# Patient Record
Sex: FEMALE | Race: WHITE | Employment: OTHER | URBAN - NONMETROPOLITAN AREA
[De-identification: names, ages, dates, MRNs, and addresses within clinical notes are randomized per-mention and may not be internally consistent; named-entity substitution may affect disease eponyms.]

---

## 2017-01-25 ENCOUNTER — AMBULATORY - GICH (OUTPATIENT)
Dept: GERIATRICS | Facility: OTHER | Age: 82
End: 2017-01-25

## 2017-01-25 DIAGNOSIS — Z91.81 HISTORY OF FALLING: ICD-10-CM

## 2017-01-25 DIAGNOSIS — Z51.5 ENCOUNTER FOR PALLIATIVE CARE: ICD-10-CM

## 2017-01-25 DIAGNOSIS — G30.1 ALZHEIMER'S DISEASE WITH LATE ONSET (CODE) (H): ICD-10-CM

## 2017-01-25 DIAGNOSIS — F02.818 DEMENTIA IN OTHER DISEASES CLASSIFIED ELSEWHERE WITH BEHAVIORAL DISTURBANCE: ICD-10-CM

## 2017-01-25 DIAGNOSIS — R60.0 LOCALIZED EDEMA: ICD-10-CM

## 2017-01-25 DIAGNOSIS — E03.8 OTHER SPECIFIED HYPOTHYROIDISM: ICD-10-CM

## 2017-03-30 ENCOUNTER — AMBULATORY - GICH (OUTPATIENT)
Dept: GERIATRICS | Facility: OTHER | Age: 82
End: 2017-03-30

## 2017-03-30 DIAGNOSIS — R69 ILLNESS: ICD-10-CM

## 2017-04-04 ENCOUNTER — HOSPITAL - GICH (OUTPATIENT)
Dept: LAB | Facility: OTHER | Age: 82
End: 2017-04-04

## 2017-04-04 DIAGNOSIS — E03.9 HYPOTHYROIDISM: ICD-10-CM

## 2017-04-04 DIAGNOSIS — E87.0 HYPEROSMOLALITY AND HYPERNATREMIA: ICD-10-CM

## 2017-04-04 LAB
ANION GAP - HISTORICAL: 8 (ref 5–18)
BUN SERPL-MCNC: 15 MG/DL (ref 7–25)
BUN/CREAT RATIO - HISTORICAL: 17
CALCIUM SERPL-MCNC: 9.2 MG/DL (ref 8.6–10.3)
CHLORIDE SERPLBLD-SCNC: 108 MMOL/L (ref 98–107)
CO2 SERPL-SCNC: 24 MMOL/L (ref 21–31)
CREAT SERPL-MCNC: 0.87 MG/DL (ref 0.7–1.3)
GFR IF NOT AFRICAN AMERICAN - HISTORICAL: >60 ML/MIN/1.73M2
GLUCOSE SERPL-MCNC: 84 MG/DL (ref 70–105)
POTASSIUM SERPL-SCNC: 4.2 MMOL/L (ref 3.5–5.1)
SODIUM SERPL-SCNC: 140 MMOL/L (ref 133–143)
TSH - HISTORICAL: 3.97 UIU/ML (ref 0.34–5.6)

## 2017-05-25 ENCOUNTER — AMBULATORY - GICH (OUTPATIENT)
Dept: GERIATRICS | Facility: OTHER | Age: 82
End: 2017-05-25

## 2017-05-25 ENCOUNTER — AMBULATORY - GICH (OUTPATIENT)
Dept: FAMILY MEDICINE | Facility: OTHER | Age: 82
End: 2017-05-25

## 2017-05-25 DIAGNOSIS — S70.02XA CONTUSION OF LEFT HIP: ICD-10-CM

## 2017-05-25 DIAGNOSIS — S70.12XA CONTUSION OF LEFT THIGH: ICD-10-CM

## 2017-05-25 DIAGNOSIS — F02.818 DEMENTIA IN OTHER DISEASES CLASSIFIED ELSEWHERE WITH BEHAVIORAL DISTURBANCE: ICD-10-CM

## 2017-05-25 DIAGNOSIS — B00.9 HERPESVIRAL INFECTION: ICD-10-CM

## 2017-05-25 DIAGNOSIS — Z74.09 OTHER REDUCED MOBILITY: ICD-10-CM

## 2017-05-25 DIAGNOSIS — E03.8 OTHER SPECIFIED HYPOTHYROIDISM: ICD-10-CM

## 2017-05-25 DIAGNOSIS — G30.1 ALZHEIMER'S DISEASE WITH LATE ONSET (CODE) (H): ICD-10-CM

## 2017-05-25 DIAGNOSIS — Z51.5 ENCOUNTER FOR PALLIATIVE CARE: ICD-10-CM

## 2017-06-09 ENCOUNTER — AMBULATORY - GICH (OUTPATIENT)
Dept: GERIATRICS | Facility: OTHER | Age: 82
End: 2017-06-09

## 2017-06-09 DIAGNOSIS — Z51.5 ENCOUNTER FOR PALLIATIVE CARE: ICD-10-CM

## 2017-06-09 DIAGNOSIS — F02.80 DEMENTIA IN OTHER DISEASES CLASSIFIED ELSEWHERE WITHOUT BEHAVIORAL DISTURBANCE: ICD-10-CM

## 2017-06-09 DIAGNOSIS — E03.8 OTHER SPECIFIED HYPOTHYROIDISM: ICD-10-CM

## 2017-06-09 DIAGNOSIS — G30.0 ALZHEIMER'S DISEASE WITH EARLY ONSET (CODE) (H): ICD-10-CM

## 2017-06-09 DIAGNOSIS — I10 ESSENTIAL (PRIMARY) HYPERTENSION: ICD-10-CM

## 2017-06-27 ENCOUNTER — COMMUNICATION - GICH (OUTPATIENT)
Dept: GERIATRICS | Facility: OTHER | Age: 82
End: 2017-06-27

## 2017-06-27 DIAGNOSIS — R60.0 LOCALIZED EDEMA: ICD-10-CM

## 2017-06-27 DIAGNOSIS — E03.8 OTHER SPECIFIED HYPOTHYROIDISM: ICD-10-CM

## 2017-07-19 ENCOUNTER — COMMUNICATION - GICH (OUTPATIENT)
Dept: FAMILY MEDICINE | Facility: OTHER | Age: 82
End: 2017-07-19

## 2017-08-08 ENCOUNTER — AMBULATORY - GICH (OUTPATIENT)
Dept: FAMILY MEDICINE | Facility: OTHER | Age: 82
End: 2017-08-08

## 2017-08-08 ENCOUNTER — AMBULATORY - GICH (OUTPATIENT)
Dept: GERIATRICS | Facility: OTHER | Age: 82
End: 2017-08-08

## 2017-08-08 DIAGNOSIS — R32 URINARY INCONTINENCE: ICD-10-CM

## 2017-08-08 DIAGNOSIS — F02.818 DEMENTIA IN OTHER DISEASES CLASSIFIED ELSEWHERE WITH BEHAVIORAL DISTURBANCE: ICD-10-CM

## 2017-08-08 DIAGNOSIS — G30.1 ALZHEIMER'S DISEASE WITH LATE ONSET (CODE) (H): ICD-10-CM

## 2017-08-08 DIAGNOSIS — B00.9 HERPESVIRAL INFECTION: ICD-10-CM

## 2017-08-08 DIAGNOSIS — L01.1 IMPETIGINIZATION OF OTHER DERMATOSES: ICD-10-CM

## 2017-08-08 DIAGNOSIS — L08.89 OTHER SPECIFIED LOCAL INFECTIONS OF THE SKIN AND SUBCUTANEOUS TISSUE: ICD-10-CM

## 2017-08-08 DIAGNOSIS — Z51.5 ENCOUNTER FOR PALLIATIVE CARE: ICD-10-CM

## 2017-08-08 DIAGNOSIS — R39.81 FUNCTIONAL URINARY INCONTINENCE: ICD-10-CM

## 2017-09-01 ENCOUNTER — AMBULATORY - GICH (OUTPATIENT)
Dept: GERIATRICS | Facility: OTHER | Age: 82
End: 2017-09-01

## 2017-09-01 ENCOUNTER — AMBULATORY - GICH (OUTPATIENT)
Dept: FAMILY MEDICINE | Facility: OTHER | Age: 82
End: 2017-09-01

## 2017-09-01 DIAGNOSIS — F02.818 DEMENTIA IN OTHER DISEASES CLASSIFIED ELSEWHERE WITH BEHAVIORAL DISTURBANCE: ICD-10-CM

## 2017-09-01 DIAGNOSIS — Z74.09 OTHER REDUCED MOBILITY: ICD-10-CM

## 2017-09-01 DIAGNOSIS — Z51.5 ENCOUNTER FOR PALLIATIVE CARE: ICD-10-CM

## 2017-09-01 DIAGNOSIS — G30.1 ALZHEIMER'S DISEASE WITH LATE ONSET (CODE) (H): ICD-10-CM

## 2017-09-01 DIAGNOSIS — Z79.899 OTHER LONG TERM (CURRENT) DRUG THERAPY: ICD-10-CM

## 2017-09-01 DIAGNOSIS — B00.9 HERPESVIRAL INFECTION: ICD-10-CM

## 2017-09-07 ENCOUNTER — COMMUNICATION - GICH (OUTPATIENT)
Dept: FAMILY MEDICINE | Facility: OTHER | Age: 82
End: 2017-09-07

## 2017-09-07 ENCOUNTER — AMBULATORY - GICH (OUTPATIENT)
Dept: FAMILY MEDICINE | Facility: OTHER | Age: 82
End: 2017-09-07

## 2017-09-07 ENCOUNTER — AMBULATORY - GICH (OUTPATIENT)
Dept: GERIATRICS | Facility: OTHER | Age: 82
End: 2017-09-07

## 2017-09-07 DIAGNOSIS — B00.9 HERPESVIRAL INFECTION: ICD-10-CM

## 2017-09-07 DIAGNOSIS — Z51.5 ENCOUNTER FOR PALLIATIVE CARE: ICD-10-CM

## 2017-09-07 DIAGNOSIS — F02.818 DEMENTIA IN OTHER DISEASES CLASSIFIED ELSEWHERE WITH BEHAVIORAL DISTURBANCE: ICD-10-CM

## 2017-09-07 DIAGNOSIS — R63.4 ABNORMAL WEIGHT LOSS: ICD-10-CM

## 2017-09-07 DIAGNOSIS — R32 URINARY INCONTINENCE: ICD-10-CM

## 2017-09-07 DIAGNOSIS — G30.1 ALZHEIMER'S DISEASE WITH LATE ONSET (CODE) (H): ICD-10-CM

## 2017-09-07 DIAGNOSIS — F03.90 DEMENTIA WITHOUT BEHAVIORAL DISTURBANCE (H): ICD-10-CM

## 2017-09-07 DIAGNOSIS — Z79.899 OTHER LONG TERM (CURRENT) DRUG THERAPY: ICD-10-CM

## 2017-09-29 ENCOUNTER — AMBULATORY - GICH (OUTPATIENT)
Dept: GERIATRICS | Facility: OTHER | Age: 82
End: 2017-09-29

## 2017-09-29 DIAGNOSIS — E03.8 OTHER SPECIFIED HYPOTHYROIDISM: ICD-10-CM

## 2017-09-29 DIAGNOSIS — F02.818 DEMENTIA IN OTHER DISEASES CLASSIFIED ELSEWHERE WITH BEHAVIORAL DISTURBANCE: ICD-10-CM

## 2017-09-29 DIAGNOSIS — G30.1 ALZHEIMER'S DISEASE WITH LATE ONSET (CODE) (H): ICD-10-CM

## 2017-12-19 ENCOUNTER — AMBULATORY - GICH (OUTPATIENT)
Dept: SCHEDULING | Facility: OTHER | Age: 82
End: 2017-12-19

## 2017-12-24 ENCOUNTER — HISTORY (OUTPATIENT)
Dept: GERIATRICS | Facility: OTHER | Age: 82
End: 2017-12-24

## 2017-12-27 NOTE — PROGRESS NOTES
Patient Information     Patient Name MRN Tika Bhatt [id number] Female 1933      Progress Notes by Nadia Ya NP at 2017  3:20 AM     Author:  Nadia Ya NP Service:  (none) Author Type:  PHYS- Nurse Practitioner     Filed:  2017  6:26 PM Encounter Date:  2017 Status:  Signed     :  Nadia Ya NP (PHYS- Nurse Practitioner)            .

## 2017-12-27 NOTE — PROGRESS NOTES
Patient Information     Patient Name MRN Tika Bhatt [id number] Female 1933      Progress Notes by Nadia Ya NP at 2017  3:00 AM     Author:  Nadia Ya NP Service:  (none) Author Type:  PHYS- Nurse Practitioner     Filed:  2017 10:27 PM Encounter Date:  2017 Status:  Signed     :  Nadia Ya NP (PHYS- Nurse Practitioner)            .

## 2017-12-28 NOTE — TELEPHONE ENCOUNTER
Patient Information     Patient Name MRN Tika Bhatt [id number] Female 1933      Telephone Encounter by Helene Blakely RN at 2017  9:01 AM     Author:  Helene Blakely RN Service:  (none) Author Type:  NURS- Registered Nurse     Filed:  2017  9:04 AM Encounter Date:  2017 Status:  Signed     :  Helene Blakely RN (NURS- Registered Nurse)            Hypothyroidism    Office visit in the past 12 months or per provider note.    Last visit with NASEEM COFFMAN was on: 17  Next visit with NASEEM COFFMAN is on: No future appointment listed with this provider  Next visit with Nursing Home is on: No future appointment listed in this department    Lab testing requirements:  TSH annually  TSH (uIU/mL)    Date Value   2017 3.97       Max refill for 12 months from last office visit or per provider note.    Prescription refilled per RN Medication Refill Policy.................... Helene Blakely RN ....................  2017   9:03 AM

## 2017-12-28 NOTE — PROGRESS NOTES
Patient Information     Patient Name MRN Tika Bhatt [id number] Female 1933      Progress Notes by Nadia Ya NP at 2017  3:00 AM     Author:  Nadia Ya NP Service:  (none) Author Type:  PHYS- Nurse Practitioner     Filed:  2017  8:48 PM Encounter Date:  2017 Status:  Signed     :  Nadia Ya NP (PHYS- Nurse Practitioner)            .

## 2017-12-28 NOTE — TELEPHONE ENCOUNTER
Patient Information     Patient Name MRN Tika Bhatt [id number] Female 1933      Telephone Encounter by Carlito Maria MD at 2017 10:51 AM     Author:  Carlito Maria MD Service:  (none) Author Type:  Physician     Filed:  2017 10:53 AM Encounter Date:  2017 Status:  Signed     :  Carlito Maria MD (Physician)            I think there will likely be some temporary increased confusion but that it will likely resolve over the course of a few days and would be ok.    I don't have a problem with her starting PT but think it is probably not going to significantly change her ambulatory status as I would be concerned about her falling even if she had a bit more strength.    I would want her to be supervised with exercise.  Does he want me to put in a PT order to see if she would tolerate it?

## 2017-12-28 NOTE — TELEPHONE ENCOUNTER
"Patient Information     Patient Name MRN Tika Bhatt [id number] Female 1933      Telephone Encounter by Hortencia Foreman at 2017 12:20 PM     Author:  Hortencia Foreman  Service:  (none) Author Type:  (none)     Filed:  2017  1:53 PM  Encounter Date:  2017 Status:  Addendum     :  Hortencia Foreman        Related Notes: Original Note by Hortencia Foreman filed at 2017 12:30 PM            She is [facility] (special cares [facility]) and recently [facility] a [facility] one of their units into a memory care unit and they are using this unit. [facility] will be turning into a more secure (to protect wandering patients) but since Tika doesn't attempt to wander, they would like to open her space to someone who is more at risk for \"exit seeking\".   The move would not be very far, and the rooms are pretty similar, so her scenery would not change much. She would be moving over to the [facility] unit.    Ethel [phone] (Nurse manager) OK to leave voicemail.  Hortencia Foreman CMA([facility])  ..................2017   12:12 PM           "

## 2017-12-28 NOTE — PROGRESS NOTES
Patient Information     Patient Name MRN Sex Tika Strickland [id number] Female 1933      Progress Notes by Carlito Maria MD at 2017  5:20 AM     Author:  Carlito Maria MD  Service:  (none) Author Type:  Physician     Filed:  10/5/2017 11:07 AM  Encounter Date:  2017 Status:  Addendum     :  Carlito Maria MD (Physician)        Related Notes: Original Note by Carlito Maria MD (Physician) filed at 10/5/2017  9:52 AM            60 Day Re-certification    SUBJECTIVE:  Tika Au is a 84 y.o. Female.  Patient seems to suggest that she feels well. She is jovial and laughing. Her speech is fluent but she does not make any cognitive sense. Does not appear to be any pain.    Nursing Notes were reviewed.  Patient has been consuming greater than 75% of her meals with nutritional supplements as well. She has been taking it over a liter of fluid on daily average. She is in assisted of 1-2 staff with ADLs and 2 for mobility. Current dmitriy score 16. She is always been incontinent of bowel and bladder. She has not had any falls in the past 60 days. She does get light therapy daily and also has mirtazapine once daily for depressive disorder. They have not noticed any mood changes. She has not needed any Tylenol in the past month for complaints of pain. She does take Valtrex daily for HSV and has not had an outbreak.    Family was not present during this visit.    Current Outpatient Prescriptions on File Prior to Visit       Medication  Sig Dispense Refill     acetaminophen (TYLENOL) 325 mg tablet Take 2 tablets by mouth every 4 hours if needed. Max acetaminophen dose: 4000mg in 24 hrs. 100 tablet 0     levothyroxine (SYNTHROID) 50 mcg tablet TAKE 1 TAB BY MOUTH ONCE DAILY 90 tablet 2     medication order composer Milk of Magnesia Suspension 7.75% - Give 1 Tbsp by mouth as needed for bowel management.  0     mirtazapine (REMERON) 7.5 mg tablet Take 1 tablet by  mouth at bedtime.       MISCELLANEOUS MEDICAL SUPPLY (GRADUATED COMPRESSION STOCKINGS) Mild compression stockings feet to knees on during day off at night 1 Packet 1     MULTIVITAMIN ORAL Take 1 tablet by mouth.       nutritional supplements liqd House supplement TID between meals for loss of appetite. 1 Bottle 0     valACYclovir (VALTREX) 500 mg tablet Take 1 tablet by mouth once daily. 90 tablet 3     No current facility-administered medications on file prior to visit.        Patient Active Problem List     Diagnosis  Code     HYPOTHYROIDISM E03.9     ALZHEIMER'S DISEASE G30.9     TOBACCO ABUSE, HX OF Z87.891     Hypertension I10     Hyperlipidemia E78.5     Ankle edema M25.473     Recurrent herpes simplex B00.9     Late onset Alzheimer's disease with behavioral disturbance G30.1, F02.81     Bilateral lower extremity edema R60.0     Comfort measures only status Z51.5     CAP (community acquired pneumonia) J18.9     Weight loss R63.4     Nursing home resident Z59.3       Social History     Social History        Marital status:       Spouse name: N/A     Number of children:  N/A     Years of education:  N/A     Occupational History      Not on file.     Social History Main Topics        Smoking status:  Former Smoker     Types: Cigarettes     Quit date: 10/4/2016     Smokeless tobacco:  Never Used     Alcohol use  No     Drug use:  No     Sexual activity:  Not on file     Other Topics  Concern     Not on file      Social History Narrative     .   in care facility in [place].  3 sons.                       A comprehensive review of systems was unobtainable due to dementia    OBJECTIVE:  BP- 132/82 T-97.5 P-75 R-18 O2- 96% RA weight 150lbs.  EXAM:  General Appearance: Pleasant, alert, appropriate appearance for age. No acute distress  Thyroid Exam: No nodules or enlargement.  Chest/Respiratory Exam: Normal chest wall and respirations. Clear to auscultation.  Cardiovascular Exam: Regular rate  and rhythm. S1, S2, no murmur, click, gallop, or rubs.  Musculoskeletal Exam: No synovitis.  Muscle strength age and body habitus appropriate as well as equal and even.     ASSESSMENT/Plan :    CODE STATUS/PLAN OF CARE: DNR, POLST states to call son prior to emergency department and to try to utilize limited interventions. I discussed this with him at our last visit and he feels that depending on the likelihood for improvement feels that she would either want to be kept comfortable or perhaps evaluated in the emergency department and given antibiotics etc. for a potentially reversible acute condition.    Tika was seen today for nursing home visit.    Diagnoses and all orders for this visit:    Other specified hypothyroidism    Late onset Alzheimer's disease with behavioral disturbance  patient appears to be stable. She does have end-stage dementia and currently is unable to effectively communicate. She is fluent but does not make cognitive sense. She appears to be in a good mood and I would not suggest any further medication changes at this time. Labs would also be of minimal help.          Carlito Maria MD    This document was created using computer generated templates and voice activated software. Every effort has been made to assure accuracy, but errors may be present

## 2017-12-28 NOTE — PROGRESS NOTES
Patient Information     Patient Name MRN Tika Bhatt [id number] Female 1933      Progress Notes by Nadia Ya NP at 2017  3:20 AM     Author:  Nadia Ya NP Service:  (none) Author Type:  PHYS- Nurse Practitioner     Filed:  8/10/2017  5:39 PM Encounter Date:  2017 Status:  Signed     :  Nadia Ya NP (PHYS- Nurse Practitioner)            .

## 2017-12-28 NOTE — TELEPHONE ENCOUNTER
Patient Information     Patient Name MRN Tika Bhatt [id number] Female 1933      Telephone Encounter by Carlito Maria MD at 2017  9:33 PM     Author:  Carlito Maria MD Service:  (none) Author Type:  Physician     Filed:  2017  9:34 PM Encounter Date:  2017 Status:  Signed     :  Carlito Maria MD (Physician)            Adjusted and signed.  Thank you.

## 2017-12-28 NOTE — TELEPHONE ENCOUNTER
Patient Information     Patient Name MRN Tika Bhatt [id number] Female 1933      Telephone Encounter by Vielka Martinez at 2017 11:50 AM     Author:  Vielka Martinez Service:  (none) Author Type:  (none)     Filed:  2017 11:57 AM Encounter Date:  2017 Status:  Signed     :  Vielka Martinez            The patient's son, Sd, called the [facility].  He states he needs a letter written by Dr. Maria stating his mother has Alzheimer's and that she needs help with her financial affairs.  Sd stated his mother has been seen recently.  Sd stated he can be reached at [phone] if there are any questions regarding this.  He states it is okay to leave message.   Vielka Martinez ....................  2017   11:55 AM

## 2017-12-28 NOTE — PROGRESS NOTES
Patient Information     Patient Name MRN Tika Bhatt [id number] Female 1933      Progress Notes by Carlito Maria MD at 2017  4:40 AM     Author:  Carlito Maria MD Service:  (none) Author Type:  Physician     Filed:  2017 11:13 AM Encounter Date:  2017 Status:  Signed     :  Carlito Maria MD (Physician)            60 Day Re-certification    SUBJECTIVE:  Tika Au is a 84 y.o. Female.  Patient is smiling and comfortable. Some of her speech is intelligible.  Family feels that she has been comfortable. They have not noticed any concerns her son Sd,  Maco and another son were all present today. They feel like she is doing well. They feel like she is comfortable. We talked about care plan. They would want her to only oh have oral antibiotics and not be hospitalized.    Nursing Notes were reviewed. She has been doing fine. There was no concerns commented on.    Family was  present during this visit.    Current Outpatient Prescriptions on File Prior to Visit       Medication  Sig Dispense Refill     acetaminophen (TYLENOL) 325 mg tablet Take 2 tablets by mouth every 4 hours if needed. Max acetaminophen dose: 4000mg in 24 hrs. 100 tablet 0     medication order composer Milk of Magnesia Suspension 7.75% - Give 1 Tbsp by mouth as needed for bowel management.  0     mirtazapine (REMERON) 7.5 mg tablet Take 1 tablet by mouth at bedtime.       MISCELLANEOUS MEDICAL SUPPLY (GRADUATED COMPRESSION STOCKINGS) Mild compression stockings feet to knees on during day off at night 1 Packet 1     MULTIVITAMIN ORAL Take 1 tablet by mouth.       nutritional supplements liqd House supplement TID between meals for loss of appetite. 1 Bottle 0     valACYclovir (VALTREX) 500 mg tablet Take 1 tablet by mouth. x3 day for recurrent HSV outbreak       No current facility-administered medications on file prior to visit.        Patient Active Problem List     Diagnosis   Code     HYPOTHYROIDISM E03.9     ALZHEIMER'S DISEASE G30.9     TOBACCO ABUSE, HX OF Z87.891     Hypertension I10     Hyperlipidemia E78.5     Ankle edema M25.473     Recurrent herpes simplex B00.9     Late onset Alzheimer's disease with behavioral disturbance G30.1, F02.81     Bilateral lower extremity edema R60.0     Comfort measures only status Z51.5     CAP (community acquired pneumonia) J18.9     Weight loss R63.4       Social History     Social History        Marital status:       Spouse name: N/A     Number of children:  N/A     Years of education:  N/A     Occupational History      Not on file.     Social History Main Topics        Smoking status:  Former Smoker     Types: Cigarettes     Quit date: 10/4/2016     Smokeless tobacco:  Never Used     Alcohol use  No     Drug use:  No     Sexual activity:  Not on file     Other Topics  Concern     Not on file      Social History Narrative     .   in care facility in [place].  3 sons.                       ROS was unobtainable from the patient due to dementia; no further information was contributory      OBJECTIVE:  Blood pressure 101/74 pulse 73 respirations 14 and O2 sats 98% temperature 98.1 weight 151 pounds  EXAM:  General Appearance: Pleasant, alert, appropriate appearance for age. No acute distress  Thyroid Exam: No nodules or enlargement.  Chest/Respiratory Exam: Normal chest wall and respirations. Clear to auscultation.  Cardiovascular Exam: Regular rate and rhythm.    Gastrointestinal Exam: Soft, nontender, no abnormal masses or organomegaly.  Neurologic Exam: Nonfocal; symmetric DTRs, normal gross motor movement, tone, and coordination. No tremor.  Psychiatric Exam: Alert and oriented, appropriate affect.    ASSESSMENT/Plan :    CODE STATUS/PLAN OF CARE: DNR/DNI - no hospitalization, oral antibiotics only this was discussed with family today. Sd her son is power of  phone #[phone]    Tika was seen today for nursing  home visit.    Diagnoses and all orders for this visit:    Other specified hypothyroidism  check TSH annually. Was normal last April    Hypertension  blood pressure well controlled.    Comfort measures only status    Alzheimer's dementia without behavioral disturbance  severe. Discussed plan of care as noted above.    Carlito Maria MD    This document was created using computer generated templates and voice activated software. Every effort has been made to assure accuracy, but errors may be present

## 2017-12-28 NOTE — TELEPHONE ENCOUNTER
"Patient Information     Patient Name Tika Medina [id number] Female 1933      Telephone Encounter by Hortencia Foreman at 2017  1:55 PM     Author:  Hortencia Foreman Service:  (none) Author Type:  (none)     Filed:  2017  2:04 PM Encounter Date:  2017 Status:  Signed     :  Hortencia Foreman            (See previous message from 12:12pm first)   Chet feels that the move won't be a good idea, but wanted Dr Maria's opinion on it. He feels that the change in her surroundings would confuse her more but I did try to reassure him that it is possible to set the new room up exactly the way her present room looks. She is not very ambulatory anymore so she probably won't leave her room on her own and \"get lost\" in the halls, but this is also why Chet wanted to know if she would benefit from PT or something. (He asked the RN manager there about having her try to ride an exercise bike a couple of times a week or something).  What are your thoughts?    Hortencia Foreman CMA(AAMA)  ..................2017   2:03 PM         "

## 2017-12-28 NOTE — TELEPHONE ENCOUNTER
Patient Information     Patient Name MRTika Mcintosh [id number] Female 1933      Telephone Encounter by Ginger Cates at 2017 11:53 AM     Author:  Ginger Cates Service:  (none) Author Type:  (none)     Filed:  2017 11:56 AM Encounter Date:  2017 Status:  Signed     :  Ginger Cates            Please call Chet saad son as he has some concerns. The nursing home is wanting to move her to a different wing and he feels it may be too much on her? He can be reached at [phone]

## 2017-12-29 NOTE — H&P
Patient Information     Patient Name MRN Sex Tika Strickland [id number] Female 1933      H&P signed by Nadia Ya NP at 8/10/2017  4:31 PM      Author:  Nadia Ya NP Service:  (none) Author Type:  PHYS- Nurse Practitioner     Filed:  8/10/2017  4:31 PM Encounter Date:  2017 Status:  Signed     :  Nadia Ya NP (PHYS- Nurse Practitioner)            -  DATE OF SERVICE:  2017    [facility] ACUTE FOLLOWUP NOTE     PRIMARY PROVIDER   Dr. Loera.     MEDICATIONS  Reviewed. Current updated in electronic health record.     PROBLEM LIST  Problem list reviewed. See EHR.     ALLERGIES  SHE HAS NO MEDICATION ALLERGIES.    POLST   She is a Do Not Resuscitate/do not intubate status. Family would like limited treatable reversible conditions only, and her son, Sd Au, is her medical power of .     HISTORY OF PRESENT ILLNESS  I am following up on Tika uA at the request of staff. Tika has had past issues with recurrent herpes simplex to her left buttocks area. She was initially treated, and then has a p.r.n. Valtrex for 3 days. The staff report she had a recent outbreak. She was treated with the Valtrex; however, the area looks red and the vesicles look pustular now. Tika has severe dementia and the review of systems is extremely limited due to her condition.     OBJECTIVE  Tika is alert, very confused, oriented to self only. Does respond when you call her by name. 2 staff members have brought her in to the bathroom so that I could look at the area. She has 3 large pustular vesicles in the same area where she has her recurrent herpes infection. There is erythema surrounding it. I do not see any active fluctuance. I did a look at her vital signs. She has not been febrile. The staff report she has been taking food and fluids normally. She does have chronic incontinence as well. She does wear disposable underwear for this. It does not appear to be bothering her  at this time. It has been covered with a dry island dressing.     ASSESSMENT  1. Herpes simplex infection, recurrent history.   2. Secondary impetiginized dermatitis.   3. Dementia, severe, progressive.   4. Chronic incontinence.     PLAN  1. I will start some Keflex.   2. The staff will keep the area clean and dry. Will keep it covered. They are to continue using gloves and universal precautions when toileting Gisella.   3. I will give her a 1 week course of Valtrex as well as I am concerned that this will exacerbate another herpes simplex outbreak. Staff will monitor her. They will call me if she is not responding.       JULIO HENSON/aba   D:  2017 11:16:41  T:  08/10/2017 11:30:29  Voice Job ID:  [id number]  Text Job ID:  [id number]  cc:NURSING HOME  MICHEAL FLANNERY MD, PRIMARY PHYSICIAN         [facility] [place], [place]:  GISELLA VAUGHAN  MR#:  [id number]  :  1933  DATE:  2017  LOCATION:  [facility]  ROOM:    TYPE:  CLINURSING HOME REPORTPage 1 of

## 2017-12-29 NOTE — H&P
Patient Information     Patient Name MRN Sex Tika Strickland [id number] Female 1933      H&P signed by Nadia Ya NP at 2017  8:35 AM  Also signed by Nadia Ya NP at 2017  8:35 AM      Author:  Nadia Ya NP Service:  (none) Author Type:  PHYS- Nurse Practitioner     Filed:  2017  8:35 AM Encounter Date:  2017 Status:  Signed     :  Nadia Ya NP (PHYS- Nurse Practitioner)            DATE OF SERVICE:  2017    This is a [facility] acute followup visit and medication management.    PRIMARY:  Dr. Loera    MEDICATIONS:  Were reviewed, reconciled in electronic health record.    PROBLEM LIST:  Reviewed, see EHR.    POSLT:  Was reviewed, she is a comfortable care status only.      ALLERGIES:  She has no medication allergies.    HISTORY OF PRESENT ILLNESS:  I am following up on Tika Au.  She has a history of recurrent herpes simplex infection on her buttocks, most recently about a month.  She developed a secondary infection to that recent herpes outbreak.  I did need to treat her with a course of antibiotics, and I treated her with a 1 week course of Valtrex as well.  That did resolve nicely; however, the staff report that she has developed another herpes simplex outbreak in the same spot on her buttocks.  Up until this point, staff have been using p.r.n. Valtrex at onset for 3 days b.i.d., but the outbreaks appear to be getting more frequent.  There have been no other changes with Tika.  Her appetite has remained the same.  Bowel and bladder habits have remained the same.  She has been afebrile.  Her son, Chet, is visiting her right now.  He is her medical power of .  Chet had wanted the staff to remove a ring from her finger.  It is not causing any compression, but it is not able to be removed over her DIP.  The staff were unable to remove the ring by trying some conservative treatments at the nursing home.  Chet would like to  get this removed.  He does not want to bring her to the ER.  I did talk with a local jeweler.  They would be willing to come out and remove the ring.  Chet had also spoken with the fire department as they have a ring cutter as well.  I did talk with Chet the other day, and he is planning to make a contact and get this taken care of independently before he leaves next week.      OBJECTIVE:  Tika is very pleasant, not in any distress.  She has severe dementia, unable to make conversation anymore, but does not appear to be in any pain or distress, has recurrent vesicular herpes rash on her right buttock.  It does not look impetiginized at this time.  Vital signs reviewed and stable.    ASSESSMENT:  1.  Recurrent herpes simplex outbreak.  2.  Medication review.  3.  Dementia, severe and progressive.  4.  History of incontinence puts her at risk for secondary impetiginization.    PLAN:  I will start a daily Valtrex 500 mg daily preventive  therapy to keep this under better control.  The staff will continue to do buck care and diligent skin care and monitor, and they will notify me if the outbreak continues or worsens.  I have discontinued the p.r.n. Valtrex order from previous.      DG HENSON/  D:09/07/2017 12:02:07  T:09/07/2017 12:26:07  DOMINIQUE: [id number]  TJID: [id number]    cc:

## 2017-12-29 NOTE — H&P
Patient Information     Patient Name MRN Tika Bhatt [id number] Female 1933      H&P signed by Nadia Ya NP at 2017  5:11 PM  Also signed by Nadia Ya NP at 2017  5:11 PM      Author:  Nadia Ya NP Service:  (none) Author Type:  PHYS- Nurse Practitioner     Filed:  2017  5:11 PM Encounter Date:  2017 Status:  Signed     :  Nadia Ya NP (PHYS- Nurse Practitioner)            DATE OF SERVICE:  2017    [facility] 60-DAY RECERTIFICATION AND UPDATE NOTE    PRIMARY:  Dr. Loera.    MEDICATIONS:  Reviewed, reconciled in electronic health record.  Patient is on:  1.  Tylenol p.r.n.  2.  Levothyroxine 50 mcg daily.  3.  Remeron 7.5 mg at bedtime.  4.  Multivitamin daily.  5.  She gets health supplements p.r.n.  6.  Valacyclovir 500 mg b.i.d. x 3 days for recurrent herpes simplex outbreaks.    ALLERGIES:   She has no known medication allergies.    PROBLEM LIST:  Reviewed.  See EHR.    POLST:  Reviewed.  She is a do not resuscitate, do not intubate status.  Comfort care measures only.  Her son, Sd Au, is her medical power of .    She had labs recently in April.  She had a TSH done 2017 that was within therapeutic range at 3.97.  She had a basic metabolic panel that was within normal range for her age and state of health.    HISTORY OF PRESENT ILLNESS:  I am here to do a 60-day recertification update note on Tika Au.  She has been a patient in the dementia unit for several years.  The nurse manager reports that she has no new concerns about Tika.  Tika did have an outbreak off her herpes simplex.  She developed a secondary staph infection.  She was treated with antibiotics and Valtrex for a week.  That has resolved.  Tika did start having a herpes simplex skin eruption on her right buttock, same place where it usually erupts.  Today, the pharmacy asked if I would reorder her p.r.n. Valtrex for 3 days as the pharmacy  discontinued it after her 1-week dose with her antibiotics.  The nurse manager also reports that her son was concerned that her ring on her ring finger is snug and family is unable to get it off.  Her finger is not swollen at this time.  The staff have tried multiple measures to get her ring to slip off.  They have been unsuccessful.  Staff is asking for a ring cutter.  I did explain to them that we do not have a ring cutter.  There is one in the emergency department.  She could possibly be brought in for that.  There is a jeweler in town who does take rings off and will actually put a spring on the ring so that it can be worn but taken off easily.  Staff will explore this.  At this time, there are no problems.    REVIEW OF SYSTEMS:  Limited due to the degree of severe dementia.    OBJECTIVE:  I did meet with Tika.  She is oriented to self only.  It is difficult to maintain any conversation with her.  She is extremely confused; however, she seems to be not in any pain or distress, appears to be happy.  Skin is warm and dry.  Chest is clear.  Heart rate is regular.  Blood pressure is running 103-127/60-80.  She has been afebrile.  Weight is stable, 148-153 pounds.  She has no pedal edema issues.  Pedal pulses are equal and strong.  Nurse manager reports there are no skin integrity issues.  Bowl and bladder habits are at baseline.    ASSESSMENT:  1.  Recurrent herpes simplex. Valtrex p.r.n. for 3 days seems to be effective.  I will restart that.  If she is becoming more frequent with her outbreaks, I would consider a daily dose for prophylaxis.  2.  Comfort care measures only status.  3.  Dementia, severe and progressive.  4.  Hypothyroidism, controlled on current medications.  5.  History of hypertension.  She is controlled without medication at this time.  6.  Recurrent herpes.    PLAN:  1.  Total plan of care reviewed. Medication and treatments reviewed and renewed.  Continue to do standing orders.    2.  I will  reorder and restart the p.r.n. Valtrex.  I would like the staff to follow up with me on Tuesday. If the frequency continues to increase I would consider daily therapy.      DG RUSSELL/gabbie  D:09/01/2017 17:03:07  T:09/02/2017 18:47:15  VJIMERY: [id number]  TJID: [id number]    cc:

## 2018-01-03 NOTE — PROGRESS NOTES
Patient Information     Patient Name MRN Tika Bhatt [id number] Female 1933      Progress Notes by Nadia Ya NP at 2017  3:00 AM     Author:  Nadia Ya NP Service:  (none) Author Type:  PHYS- Nurse Practitioner     Filed:  2017  3:41 PM Encounter Date:  2017 Status:  Signed     :  Nadia Ya NP (PHYS- Nurse Practitioner)            .

## 2018-01-03 NOTE — H&P
Patient Information     Patient Name MRN Sex Tika Strickland [id number] Female 1933      H&P signed by Nadia Ya NP at 2017  1:55 PM  Also signed by Nadia Ya NP at 2017  1:55 PM      Author:  Nadia Ya NP Service:  (none) Author Type:  PHYS- Nurse Practitioner     Filed:  2017  1:55 PM Encounter Date:  2017 Status:  Signed     :  Nadia Ya NP (PHYS- Nurse Practitioner)            -  DATE OF SERVICE:  2017    [facility] 60 DAY RECERTIFICATION AND UPDATE NOTE     PRIMARY PROVIDER  Carlito Maria MD.    MEDICATIONS  Reviewed per MAR, reconciled in electronic health record. Currently, the patient is on:  1. Levothyroxine 50 mcg daily.   2. Milk of Magnesia p.r.n.   3. Mirtazapine 7.5 mg at bedtime.   4. Multivitamin daily.   5. Tylenol p.r.n.   6. Valtrex 500 mg tabs for recurrent herpes simplex virus. Staff are to give 2 times a day for an outbreak x 3 days.     PROBLEM LIST  Reviewed. See EHR.     ALLERGIES  No medication allergies. POLST was reviewed. The patient is a DNR/DNI status. Limited interventions, treat reversible conditions only. Do not intubate status. Oral antibiotics, IV/IM antibiotics, oral fluids if indicated. In the event of a change of condition, we are to notify Sd Au, her son, and her medical power of  and guardian.     HISTORY OF PRESENT ILLNESS  I am here to do a 60-day recertification update note on Tika Au, initially admitted in 2015 for severe dementia and behaviors requiring skilled care. The nurse manager reports that Tika has been at baseline. She is eating almost 75% consistently of her meals, on average. She does take nutritional supplements between meals. Drinks at least a liter of fluids daily. She requires assistance of 1 to 2 staff for her ADLs. Requires assistance of one staff for mobility. She is incontinent of bowel and bladder. Bowel and bladder habits have been at baseline.  Skin is clean, dry, and intact. There are no skin integrity issues at this time. She does participate in light therapy due to her chronic depression. She has had 3 falls within the last 3 months. There was a fall mat initiated in her room per recommendation PT. Staff report no recent falls or injuries. She has had no falls in the last month.     REVIEW OF SYSTEMS  Limited due to the degree of severe dementia. I did meet with Tika in the [facility] area resting in a recliner.    OBJECTIVE  Tika is alert, oriented to self only. She is very confused, but can be very pleasant and interactive when spoken to. Blood pressures are 120 to 130 over 60 to 80. O2 saturation is 99% and above on room air. Heart rate is 68 and regular. Respirations are easy. Chest is clear. She has been afebrile. Weight is stable, 154 to 155 pounds. I did review her labs. Tika had a thyroid stimulating hormone done in April of 2016 that was 5.52 and stable. She had a CBC, which was stable for her age and state of health. A basic metabolic panel was done at that time. Her sodium at that time was 152, but at that time she was recovering from a pneumonia, and she was dehydrated. Tika is due for labs in April after her next 60 day recertification.     ASSESSMENT  1. Comfort care measures only. The patient is comfortable. She appears to be in no distress whatsoever.   2. Dementia, severe and chronic.   3. Hypothyroidism, controlled on current medication.   4. Recurrent herpes simplex infection. She has not had any recent recurrences, but her episodic therapy is effective.   5. Hypertension, controlled.     PLAN  Total plan of care reviewed. Medications and treatments reviewed and renewed. Continue standing orders. She will be due for an update in 60 days. She will be due for yearly labs in April.       JULIO HENSON   D:  01/25/2017 15:04:00  T:  01/27/2017 16:27:09  Voice Job ID:  [id number]  Text Job ID:  [id number]  cc:[facility]  MICHEAL  MD PRUDENCIO, PRIMARY PHYSICIAN         [employer] & [place], [place]:  GISELLA VAUGHAN  MR#:  [id number]  :  1933  DATE:  2017  LOCATION:  [facility]  ROOM:    TYPE:  CLINURSING HOME REPORTPage 1

## 2018-01-04 NOTE — H&P
Patient Information     Patient Name MRN Tika Bhatt [id number] Female 1933      H&P signed by Heladio More MD at 4/3/2017  6:59 AM      Author:  Hleadio More MD Service:  (none) Author Type:  Physician     Filed:  4/3/2017  6:59 AM Encounter Date:  3/30/2017 Status:  Signed     :  Heladio More MD (Physician)            -  DATE OF SERVICE:  2017    I reviewed her last 60 day recertification by Nadia Ya dated 2017; copy is in the chart.     Per review of the nurse manager's progress note, the patient has been doing well. Her weight has been 152 pounds and was 154 two months ago. She continues to eat well, eating 51% to 75% of her meals on average. She also continues to drink nutritional supplements 3 times a day between meals. She is also drinking about 1000 mL of fluid daily.     At this point in time, there are no concerns.     SUBJECTIVE  Unable to obtain any information from the patient secondary to severe dementia.     REVIEW OF SYSTEMS  Unable to obtain any meaningful information from the patient secondary to severe dementia. Per discussion with nursing floor staff, there has not been any change in her oral intake. She has continued to have her usual bladder and bowel habits. At this point in time, they also have no concerns.     SOCIAL HISTORY  The patient has been a resident in the memory unit of [facility] since 2015.     ALLERGIES  NO KNOWN DRUG ALLERGIES.     CURRENT MEDICATIONS  1. Levothyroxine 50 mcg p.o. daily.   2. Mirtazapine 7.5 mg p.o. q.h.s., reduced 2016 from 15 mg.   3. Multivitamin 1 tab p.o. daily.   4. Milk of magnesia p.r.n., not recently used.   5. Acetaminophen 325 mg p.o. every 4 hours p.r.n. pain, last used on the .     OBJECTIVE  RECENT VITAL SIGNS: Blood pressure 139/66, pulse 55 to 59, respirations 20, O2 sats 98% on room air, temperature 97.3. Weight 151.8 pounds.   GENERAL: The patient is seated in a  chair. She seems very happy talking with other residents.   LUNGS: Clear to auscultation without any crackles, wheezes, or rhonchi.   HEART: Regular rate and rhythm. Normal S1 and S2. No S3 or S4.   SKIN: No significant peripheral edema of the lower extremities.     ASSESSMENT  1. Hypothyroidism, chronic.   2. Chronic kidney disease, stage III.   3. Hypernatremia, mild, chronic, typically in the low 150s.   4. Depression, chronic.   5. Dementia, severe, chronic, stable per discussion with nursing staff.     PLAN  1. We will obtain a basic metabolic panel and TSH given her history of hypernatremia, hypothyroidism, and chronic kidney disease.   2. Total plan of care has been reviewed including medications and treatments which have been reviewed and renewed, may use standing orders.       MD FILIBERTO DURHAM/feli   D:  2017 11:42:35  T:  2017 11:51:53  Voice Job ID:  [id number]  Text Job ID:  [id number]  cc:NURSING HOME  MICHEAL FLANNERY MD, PRIMARY PHYSICIAN         [facility] & [place], [place]:  GISELLA VAUGHAN  MR#:  [id number]  :  1933  DATE:  2017  LOCATION:  [facility]  ROOM:    TYPE:  CLINURSING HOME REPORTPage 1 of 2

## 2018-01-04 NOTE — PROGRESS NOTES
Patient Information     Patient Name MRN Tika Bhatt [id number] Female 1933      Progress Notes by Heladio More MD at 3/30/2017  4:00 AM     Author:  Heladio More MD Service:  (none) Author Type:  Physician     Filed:  4/3/2017  7:07 AM Encounter Date:  3/30/2017 Status:  Signed     :  Heladio More MD (Physician)            .

## 2018-01-05 NOTE — H&P
Patient Information     Patient Name MRN Sex Gisella Strickland [id number] Female 1933      H&P signed by Nadia Ya NP at 2017  8:00 PM  Also signed by Nadia Ya NP at 2017  8:00 PM      Author:  Nadia Ya NP Service:  (none) Author Type:  PHYS- Nurse Practitioner     Filed:  2017  8:00 PM Encounter Date:  2017 Status:  Signed     :  Nadia Ya NP (PHYS- Nurse Practitioner)            -  DATE OF SERVICE:  2017    [facility] 60 DAY RECERTIFICATION/UPDATE NOTE    PRIMARY PROVIDER  Dr. Carlito Maria     MEDICATIONS  Reviewed, updated in electronic health record per MAR. Currently, the patient is on:   1. Levothyroxine 50 mcg daily.   2. Milk of Magnesia p.r.n.   3. Mirtazapine 7.5 mg daily.   4. Multivitamin daily.   5. Tylenol 325 mg p.r.n.   6. Valtrex 500 mg twice daily x3 days for recurrent herpes simplex outbreak.     ALLERGIES   No medication allergies.     PROBLEM LIST  Problem list reviewed. See EHR.     Her POLST was reviewed. GISELLA IS A DNR/DNI STATUS. Do not intubate. Limited interventions. Treat reversible conditions only. Family would like oral antibiotics if indicated, but would like a phone call for any condition change first, before further interventions. Her son, Sd Au, is her medical power of .     HISTORY OF PRESENT ILLNESS  I am here to do a 60 day recertification update note on Gisella Au, initially admitted 2015. Gisella has resided in the [facility] dementia unit. She has acclimated quite well. Nurse manager reports that her weights have been stable. She is taking her food and fluids well. She does require extensive assistance of 2 staff members for ADLs and mobility, and a transfer belt. She is incontinent. No concerns with skin integrity, according to staff, and bladder and bowel habits have been without any concerns. Recent labs were reviewed. She had a TSH done 2017 that was 3.97. Basic metabolic  panel was normal for her age and state of health.     CURRENT MEDICATIONS  Therapeutic.     REVIEW OF SYSTEMS  Unable due to the degree of severe dementia, however, Gisella is very pleasant, did not appear to be in any pain or distress whatsoever, but she is very confused.     OBJECTIVE  She is alert, oriented to self only. Blood pressures were reviewed. She is running 126 to 134/70 to 80. O2 saturation is 95% and above on room air. Heart rate is running 65 and regular. Chest is clear. She has been afebrile. Weight is stable, 151 to 152 pounds. No significant edema in her lower extremities. That was an issue initially when she was admitted to the unit. Staff report that she sleeps well at night and she interacts with staff and other residents.     PLAN  1. Total plan of care reviewed. Medications and treatments reviewed and renewed. Continue standing orders.   2. She will be due for an update in 60 days.       JULIO HENSON   D:  2017 12:09:00  T:  2017 12:23:48  Voice Job ID:  [id number]  Text Job ID:  [id number]  cc:NURSING HOME  MICHEAL FLANNERY MD, PRIMARY PHYSICIAN         [facility] & [place], [place]:  GISELLA VAUGHAN  MR#:  [id number]  :  1933  DATE:  2017  LOCATION:  [facility]  ROOM:    TYPE:  CLINURSING HOME REPORTPage 1

## 2018-01-18 ENCOUNTER — AMBULATORY - GICH (OUTPATIENT)
Dept: GERIATRICS | Facility: OTHER | Age: 83
End: 2018-01-18

## 2018-01-18 DIAGNOSIS — F02.818 DEMENTIA IN OTHER DISEASES CLASSIFIED ELSEWHERE WITH BEHAVIORAL DISTURBANCE: ICD-10-CM

## 2018-01-18 DIAGNOSIS — B00.9 HERPESVIRAL INFECTION: ICD-10-CM

## 2018-01-18 DIAGNOSIS — E03.8 OTHER SPECIFIED HYPOTHYROIDISM: ICD-10-CM

## 2018-01-18 DIAGNOSIS — I10 ESSENTIAL (PRIMARY) HYPERTENSION: ICD-10-CM

## 2018-01-18 DIAGNOSIS — G30.1 ALZHEIMER'S DISEASE WITH LATE ONSET (CODE) (H): ICD-10-CM

## 2018-01-26 ENCOUNTER — DOCUMENTATION ONLY (OUTPATIENT)
Dept: FAMILY MEDICINE | Facility: OTHER | Age: 83
End: 2018-01-26

## 2018-01-26 PROBLEM — Z78.9 NURSING HOME RESIDENT: Status: ACTIVE | Noted: 2017-09-19

## 2018-01-26 RX ORDER — ZINC OXIDE 216 MG/ML
LOTION TOPICAL
Status: ON HOLD | COMMUNITY
Start: 2016-04-12 | End: 2019-01-01

## 2018-01-26 RX ORDER — LEVOTHYROXINE SODIUM 50 UG/1
50 TABLET ORAL DAILY
Status: ON HOLD | COMMUNITY
Start: 2017-06-29 | End: 2019-01-01

## 2018-01-26 RX ORDER — ACETAMINOPHEN 325 MG/1
650 TABLET ORAL EVERY 4 HOURS PRN
Status: ON HOLD | COMMUNITY
Start: 2017-05-25 | End: 2019-01-01

## 2018-01-26 RX ORDER — MIRTAZAPINE 7.5 MG/1
7.5 TABLET, FILM COATED ORAL AT BEDTIME
COMMUNITY
Start: 2017-05-25 | End: 2019-01-01

## 2018-01-26 RX ORDER — DIPHENOXYLATE HYDROCHLORIDE AND ATROPINE SULFATE 2.5; .025 MG/1; MG/1
1 TABLET ORAL
COMMUNITY
End: 2019-01-01

## 2018-01-26 RX ORDER — VALACYCLOVIR HYDROCHLORIDE 500 MG/1
500 TABLET, FILM COATED ORAL DAILY
Status: ON HOLD | COMMUNITY
Start: 2017-09-07 | End: 2019-01-01

## 2018-01-30 ENCOUNTER — AMBULATORY - GICH (OUTPATIENT)
Dept: SCHEDULING | Facility: OTHER | Age: 83
End: 2018-01-30

## 2018-01-30 ENCOUNTER — AMBULATORY - GICH (OUTPATIENT)
Dept: GERIATRICS | Facility: OTHER | Age: 83
End: 2018-01-30

## 2018-01-30 DIAGNOSIS — R05.9 COUGH: ICD-10-CM

## 2018-02-01 ENCOUNTER — COMMUNICATION - GICH (OUTPATIENT)
Dept: GERIATRICS | Facility: OTHER | Age: 83
End: 2018-02-01

## 2018-02-06 ENCOUNTER — AMBULATORY - GICH (OUTPATIENT)
Dept: GERIATRICS | Facility: OTHER | Age: 83
End: 2018-02-06

## 2018-02-06 DIAGNOSIS — J06.9 ACUTE UPPER RESPIRATORY INFECTION: ICD-10-CM

## 2018-02-13 NOTE — PROGRESS NOTES
Patient Information     Patient Name MRTika Mcintosh [id number] Female 1933      Progress Notes by Nereyda Price NP at 2018  3:40 AM     Author:  Nereyda Price NP Service:  (none) Author Type:  PHYS- Nurse Practitioner     Filed:  2018  1:41 PM Encounter Date:  2018 Status:  Signed     :  Nereyda Price NP (PHYS- Nurse Practitioner)            [facility] Long Term Care Visit  LTC Progess Note- 60 Day Recertification    Patient Name: Tika Au  YOB: 1933 Age: 84 y.o.  Medical Record Number: [id number]    Advanced Directives: DNR/DNI comfort cares--call son Sd if change in condition    Dates: 2015 admitted to [facility] Primary Provider: Dr. Maria    HPI: Patient is seen today for evaluation for the following medical issues: Alzheimer's disease, hypertension, hypothyroidism, and recurrent herpes simplex. Pt is seen today. Sitting at dining room table. Smiles and is happy, laughing periodically. Talking with some understandable words but out of context. Eye contact appropriate. Nursing staff with no concerns. Report mood stable--can be combative with bathing. Sleeping ok. Eating and drinking ok.     ALLERGIES:  No Known Allergies    Past Medical History:    has a past medical history of Alzheimer's dementia; Hyperlipidemia; and Hypothyroid.    Past Surgical History:   has a past surgical history that includes fracture treatment (age 42); appendectomy; flexible sigmoidoscopy (3/99); and cataract removal ().    Family History:  family history includes GI Disease in her mother; Other in her brother and father.    Social History:   reports that she quit smoking about 15 months ago. Her smoking use included Cigarettes. She has never used smokeless tobacco. She reports that she does not drink alcohol or use illicit drugs.    Immunizations:   Immunization History     Administered  Date(s) Administered     AMB  Influenza, IIV3 (Age >=3 years)(Flu Clinic Only) 11/07/2012, 11/06/2013     AMB Influenza, IIV4 (age 6-35 mos) Preserve Free (Flu Clinic Only) 10/14/2014     Herpes-zoster Vaccine 12/26/2014     Influenza Virus, Unspecified 10/22/2010, 11/08/2011     Influenza, IIV3 (Age >=3 years) 10/14/2014     Pneumococcal Poly,23-Valent (Pneumovax) 01/01/2009     Pneumococcal conj 13-Valent (Prevnar 13) 05/13/2015     Td (Age >=7 Years) 01/01/2009       Current Medications:   Current Outpatient Prescriptions       Medication  Sig Dispense Refill     acetaminophen (TYLENOL) 325 mg tablet Take 2 tablets by mouth every 4 hours if needed. Max acetaminophen dose: 4000mg in 24 hrs. 100 tablet 0     levothyroxine (SYNTHROID) 50 mcg tablet TAKE 1 TAB BY MOUTH ONCE DAILY 90 tablet 2     medication order composer Milk of Magnesia Suspension 7.75% - Give 1 Tbsp by mouth as needed for bowel management.  0     mirtazapine (REMERON) 7.5 mg tablet Take 1 tablet by mouth at bedtime.       MISCELLANEOUS MEDICAL SUPPLY (GRADUATED COMPRESSION STOCKINGS) Mild compression stockings feet to knees on during day off at night 1 Packet 1     MULTIVITAMIN ORAL Take 1 tablet by mouth.       nutritional supplements liqd House supplement TID between meals for loss of appetite. 1 Bottle 0     valACYclovir (VALTREX) 500 mg tablet Take 1 tablet by mouth once daily. 90 tablet 3     No current facility-administered medications for this visit.      Medications have been reviewed by me and are current to the best of my knowledge and ability.    Medication reconciliation completed between [facility] and [facility].     Diagnostics/Labs (reviewed today):  TSH (uIU/mL)    Date Value   04/04/2017 3.97     SODIUM      Date Value Ref Range Status   04/04/2017 140 133 - 143 mmol/L Final     POTASSIUM      Date Value Ref Range Status   04/04/2017 4.2 3.5 - 5.1 mmol/L Final     CHLORIDE      Date Value Ref Range Status   04/04/2017 108 (H) 98 - 107 mmol/L Final     CO2,TOTAL       Date Value Ref Range Status   04/04/2017 24 21 - 31 mmol/L Final     ANION GAP      Date Value Ref Range Status   04/04/2017 8 5 - 18                 Final     GLUCOSE      Date Value Ref Range Status   04/04/2017 84 70 - 105 mg/dL Final     BUN      Date Value Ref Range Status   04/04/2017 15 7 - 25 mg/dL Final     CREATININE      Date Value Ref Range Status   04/04/2017 0.87 0.70 - 1.30 mg/dL Final     BUN/CREAT RATIO                Date Value Ref Range Status   04/04/2017 17                 Final     CALCIUM      Date Value Ref Range Status   04/04/2017 9.2 8.6 - 10.3 mg/dL Final     4/12/2016  WBC: 8.2     4/12/2016  RBC: 4.26  4/12/2016  HGB: 12.3  4/12/2016  HCT: 37.6   4/12/2016  MCV: 88    4/12/2016  MCH: 28.8   4/12/2016 MCHC: 32.6   4/12/2016  RDW: 13.0   4/12/2016  PLT: 247      Current Diet: Mechanical soft diet    Review Of Systems:    Denies pain. Smiles. Limited ROS due to dementia. See HPI.     Vital Signs: (obtained from nursing home record)  Temp 96.6  Pulse 58   Resp 16   /62   O2 Sats 96%   Weight 152.4 # stable    Objective:   Pleasant and alert without distress. Speech non-sensical   Skin color pink.   Lungs clear to auscultation throughout. No wheezes or rales noted.   Cardiovascular regular, S1, S2 auscultated. No murmur noted.  Abdomen soft and without masses, tenderness   Extremities without edema.   Moves upper and lower extremities       Assessment and Plan:  (G30.1,  F02.81) Late onset Alzheimer's disease with behavioral disturbance  (primary encounter diagnosis)  Comment: Chronic, progressive  Plan: Continue comfort measures. Remeron 7.5 mg daily    (I10) Hypertension  Comment: Stable.   Plan: currently taking no BP medications    (B00.9) Recurrent herpes simplex  Comment: Stable, chronic  Plan: Continue valtrex 500 mg daily for suppression      (E03.8) Other specified hypothyroidism  Comment: Stable  Plan: continue Synthroid 50 mcg daily.     Recommend labs in March/April:  TSH, BMP, CBC      A total of 30 minutes was spent with this patient, of which more than 50% was spent in counseling and/or coordination of care regarding review of Multidisciplinary notes, laboratory values, medications, vital signs, weight and orders are reviewed from nursing home records. I have reviewed the patient s medical history and updated the computerized patient record.   .     Nereyda Price NP  1/23/2018 1:30 PM

## 2018-02-13 NOTE — PROGRESS NOTES
Patient Information     Patient Name MRN Sex Tika Strickland [id number] Female 1933      Progress Notes by Nereyda Price NP at 2018  3:00 AM     Author:  Nereyda Price NP Service:  (none) Author Type:  PHYS- Nurse Practitioner     Filed:  2018  2:29 PM Encounter Date:  2018 Status:  Signed     :  Nereyda Price NP (PHYS- Nurse Practitioner)            [facility]  Acute Care Visit    Patient Name: Tika Au   : 1933  MRN: [id number]    Place of Service: [facility]  DOS: 2018    CC: Cough, possible aspiration of emesis    HPI:  Tika Au is a 84 y.o. female with PMH of multiple chronic medical concerns, who is seen today regarding nursing staff noted on  pt had an emesis x1. On , noted pt was coughing, congested, and wheezing. Respiratory monitoring was started per nursing assessment.  Concerned she may have aspirated gastric contents. No fever except very mild temp 99.2  in the afternoon.  She is up today sitting at dining room table as per her usual self. Talkative, babbling, smiling. Eating, drinking ok. Coughing does sound harsh and moist. Vitals stable.     She does not appear dyspneic.   No further emesis or diarrhea.     Talked with son Sd mobley: assessment and plan of care.       PMH:  Past Medical History:     Diagnosis  Date     Alzheimer's dementia      Hyperlipidemia      Hypothyroid        Medications:  Current Outpatient Prescriptions       Medication  Sig Dispense Refill     acetaminophen (TYLENOL) 325 mg tablet Take 2 tablets by mouth every 4 hours if needed. Max acetaminophen dose: 4000mg in 24 hrs. 100 tablet 0     levothyroxine (SYNTHROID) 50 mcg tablet TAKE 1 TAB BY MOUTH ONCE DAILY 90 tablet 2     medication order composer Milk of Magnesia Suspension 7.75% - Give 1 Tbsp by mouth as needed for bowel management.  0     mirtazapine (REMERON) 7.5 mg tablet Take 1 tablet by mouth at bedtime.        MISCELLANEOUS MEDICAL SUPPLY (GRADUATED COMPRESSION STOCKINGS) Mild compression stockings feet to knees on during day off at night 1 Packet 1     MULTIVITAMIN ORAL Take 1 tablet by mouth.       nutritional supplements liqd House supplement TID between meals for loss of appetite. 1 Bottle 0     valACYclovir (VALTREX) 500 mg tablet Take 1 tablet by mouth once daily. 90 tablet 3     No current facility-administered medications for this visit.      Medications have been reviewed by me and are current to the best of my knowledge and ability.    Medication reconciliation complete between [facility] record and NH MAR.     Allergies:  No Known Allergies    Review of Systems:  Limited ROS due to dementia. See HPI.    Vital Signs:  Temp 97.2   Pulse 66   Respirations 21   /60   Oxygen Sats 94% room air   Weight 154.6#    Physical Exam:  Pleasant and alert without distress. Talkative, some non-sensical talk. Babbling.   Sclera nonicteric, conjunctiva non-inflamed.  Skin color pink. Mucous membranes moist.   Lungs rhonchi mid to lower lobes bilaterally.  Occasional exp wheezing. Harsh loose cough.   Cardiovascular regular, S1, S2 auscultated.       Assessment/Plan:  (R05) Cough  (primary encounter diagnosis)  Comment: Acute, Possibly aspiration of emesis  Plan: Spoke with son, Sd and discussed options for treatment. He is in agreement for CXR, start albuterol nebs QID and q4 hours PRN wheezing and congestion. If CXR is consistent for aspiration, will treat with antibiotics.       A total of 30 minutes was spent with this patient, of which more than 50% was spent in counseling and/or coordination of care regarding review of Multidisciplinary notes, laboratory values, medications, vital signs, weight and orders are reviewed from nursing home records. I have reviewed the patient s medical history and updated the computerized patient record.       Nereyda Price NP ....................  1/30/2018   2:15 PM

## 2018-02-13 NOTE — TELEPHONE ENCOUNTER
Patient Information     Patient Name MRN Sex Tika Strickland [id number] Female 1933      Telephone Encounter by Nereyda Price NP at 2018  9:42 AM     Author:  Nereyda Price NP Service:  (none) Author Type:  PHYS- Nurse Practitioner     Filed:  2018  9:48 AM Encounter Date:  2018 Status:  Signed     :  Nereyda Price NP (PHYS- Nurse Practitioner)            Spoke with Sd (son) on 18 who is emergency contact #1 on [facility] contact list for Tika.     Please see note dated 18.     Discussed CXR results. No acute findings. Lungs are clear. No sign of aspiration.     Will continue albuterol neb treatments and add guaifenesin 400 mg BID and 400 mg daily PRN chest congestion.     No fever. Vitals stable.     Will continue to do respiratory monitoring per nursing staff. Check vitals daily for 7 days.     Son is in agreement with plan of care.     Nereyda Price NP ....................  2018   9:47 AM

## 2018-02-13 NOTE — PROGRESS NOTES
Patient Information     Patient Name MRN Sex Tika Strickland [id number] Female 1933      Progress Notes by Nereyda Price NP at 2018  3:00 AM     Author:  Nereyda Price NP Service:  (none) Author Type:  PHYS- Nurse Practitioner     Filed:  2018  1:36 PM Encounter Date:  2018 Status:  Signed     :  Nereyda Price NP (PHYS- Nurse Practitioner)            [facility]  Acute Care Visit    Patient Name: Tika Au   : 1933  MRN: [id number]    Place of Service: [facility]  DOS: 2018    CC: Follow up on URI    HPI:  Tika Au is a 84 y.o. female with PMH of multiple chronic medical concerns, who is seen today regarding follow up upper respiratory infection-viral. Continues to have loose cough, non-productive. Does not appear in respiratory distress or short of breath. No fevers. All vitals stable.  Eating and drinking ok.  Sleeping well at night. Does not appear in pain. Smiling and talkative per usual.       PMH:  Past Medical History:     Diagnosis  Date     Alzheimer's dementia      Hyperlipidemia      Hypothyroid        Medications:  Current Outpatient Prescriptions       Medication  Sig Dispense Refill     acetaminophen (TYLENOL) 325 mg tablet Take 2 tablets by mouth every 4 hours if needed. Max acetaminophen dose: 4000mg in 24 hrs. 100 tablet 0     levothyroxine (SYNTHROID) 50 mcg tablet TAKE 1 TAB BY MOUTH ONCE DAILY 90 tablet 2     medication order composer Milk of Magnesia Suspension 7.75% - Give 1 Tbsp by mouth as needed for bowel management.  0     mirtazapine (REMERON) 7.5 mg tablet Take 1 tablet by mouth at bedtime.       MISCELLANEOUS MEDICAL SUPPLY (GRADUATED COMPRESSION STOCKINGS) Mild compression stockings feet to knees on during day off at night 1 Packet 1     MULTIVITAMIN ORAL Take 1 tablet by mouth.       nutritional supplements liqd House supplement TID between meals for loss of appetite. 1 Bottle 0      "valACYclovir (VALTREX) 500 mg tablet Take 1 tablet by mouth once daily. 90 tablet 3     No current facility-administered medications for this visit.      Medications have been reviewed by me and are current to the best of my knowledge and ability.    Medication reconciliation complete between [facility] record and NH MAR.     Allergies:  No Known Allergies    Review of Systems:  Limited ROS due to dementia. She is smiling and clapping hands as usual. Eating and drinking well. No fever. Loose cough.Doesn't seem to be coughing anything up.  Does not appear in pain or in respiratory distress.     Vital Signs:  Temp 98.6   Pulse 66  Respirations 20   /58  Oxygen Sats 96%   Weight 153.9#    Physical Exam:  Alert without distress. Affect pleasant. Smiling and clapping hands. Talkative. Mostly non-sensical and some phrases understandable. \"God bless you,\" after I sneezed.   Sclera nonicteric, conjunctiva non-inflamed.  Skin color pink.   Lungs clear to auscultation throughout. Very faint exp wheeze.   Cardiovascular regular, S1, S2 auscultated. No murmur noted.   Extremities without edema.   Able to move upper and lower extremities. Walking with 2 staff members to her room from dining room       Assessment/Plan:  (J06.9) URI, acute  (primary encounter diagnosis)  Comment: Improving, stable  Plan: Change albuterol nebs to Duonebs QID and continue guaifenesin, push fluids       A total of 20 minutes was spent with this patient, of which more than 50% was spent in counseling and/or coordination of care regarding review of Multidisciplinary notes, laboratory values, medications, vital signs, weight and orders are reviewed from nursing home records. I have reviewed the patient s medical history and updated the computerized patient record.       Nereyda Price NP ....................  2/7/2018   1:14 PM                   "

## 2018-03-26 ENCOUNTER — APPOINTMENT (OUTPATIENT)
Dept: GERIATRICS | Facility: OTHER | Age: 83
End: 2018-03-26
Attending: FAMILY MEDICINE
Payer: COMMERCIAL

## 2018-04-24 ENCOUNTER — NURSING HOME VISIT (OUTPATIENT)
Dept: GERIATRICS | Facility: OTHER | Age: 83
End: 2018-04-24
Attending: NURSE PRACTITIONER
Payer: MEDICARE

## 2018-04-24 DIAGNOSIS — K21.9 GASTROESOPHAGEAL REFLUX DISEASE, ESOPHAGITIS PRESENCE NOT SPECIFIED: Primary | ICD-10-CM

## 2018-04-24 PROCEDURE — 99309 SBSQ NF CARE MODERATE MDM 30: CPT | Performed by: NURSE PRACTITIONER

## 2018-04-24 NOTE — MR AVS SNAPSHOT
"              After Visit Summary   2018    Tika Au    MRN: [id number]           Patient Information     Date Of Birth          1933        Visit Information        Provider Department      2018 3:15 AM Nereyda Price APRN CNP [facility]        Today's Diagnoses     Gastroesophageal reflux disease, esophagitis presence not specified    -  1       Follow-ups after your visit        Who to contact     If you have questions or need follow up information about today's clinic visit or your schedule please contact [facility] directly at [phone].  Normal or non-critical lab and imaging results will be communicated to you by Channel IQhart, letter or phone within 4 business days after the clinic has received the results. If you do not hear from us within 7 days, please contact the clinic through [employer] or phone. If you have a critical or abnormal lab result, we will notify you by phone as soon as possible.  Submit refill requests through Book[employer] or call your pharmacy and they will forward the refill request to us. Please allow 3 business days for your refill to be completed.          Additional Information About Your Visit        MyChart Information     Bookitit lets you send messages to your doctor, view your test results, renew your prescriptions, schedule appointments and more. To sign up, go to [email or web address] . Click on \"Log in\" on the left side of the screen, which will take you to the Welcome page. Then click on \"Sign up Now\" on the right side of the page.     You will be asked to enter the access code listed below, as well as some personal information. Please follow the directions to create your username and password.     Your access code is: [email or web address]  Expires: 2018 11:32 AM     Your access code will  in 90 days. If you need help or a new code, please call your [place] clinic or [phone].        Care EveryWhere ID     " This is your Care EveryWhere ID. This could be used by other organizations to access your [place] medical records  [id number]         Blood Pressure from Last 3 Encounters:   04/06/16 138/82   07/09/15 138/74   05/13/15 132/62    Weight from Last 3 Encounters:   07/09/15 162 lb (73.5 kg)   05/13/15 161 lb 6.4 oz (73.2 kg)   02/26/15 160 lb (72.6 kg)              Today, you had the following     No orders found for display       Primary Care Provider Office Phone # Fax #    Carlito Maria [phone] [address]  [facility] [address]        Equal Access to Services     ERICA NAJERA : Hadii jeffrey rodriguezo Sokaren, waaxda luqadaha, qaybta kaalmada adeegyada, elsa mohr . So [facility] [phone].    ATENCIÓN: Si habla español, tiene a keller disposición servicios gratuitos de asistencia lingüística. [facility] [phone].    We comply with applicable federal civil rights laws and [place] laws. We do not discriminate on the basis of race, color, national origin, age, disability, sex, sexual orientation, or gender identity.            Thank you!     Thank you for choosing [facility] AND [facility]  for your care. Our goal is always to provide you with excellent care. Hearing back from our patients is one way we can continue to improve our services. Please take a few minutes to complete the written survey that you may receive in the mail after your visit with us. Thank you!             Your Updated Medication List - Protect others around you: Learn how to safely use, store and throw away your medicines at www.disposemymeds.org.          This list is accurate as of 4/24/18 11:59 PM.  Always use your most recent med list.                   Brand Name Dispense Instructions for use Diagnosis    acetaminophen 325 MG tablet    TYLENOL     Take 650 mg by mouth every 4 hours as needed . Max acetaminophen dose: 4000mg in 24 hrs.        levothyroxine 50 MCG  tablet    SYNTHROID/LEVOTHROID     Take 50 mcg by mouth daily        Medical Compression Stockings Misc      MISCELLANEOUS MEDICAL SUPPLY (GRADUATED COMPRESSION STOCKINGS). Mild compression stockings feet to knees on during day off at night        MILK OF MAGNESIA 400 MG/5ML suspension   Generic drug:  magnesium hydroxide      Take 1 Tablespoonful by mouth as needed for bowel management.        mirtazapine 7.5 MG Tabs tablet    REMERON     Take 7.5 mg by mouth At Bedtime        MULTI-VITAMINS Tabs      Take 1 tablet by mouth        NUTRITIONAL SUPPLEMENT Liqd      House supplement TID between meals for loss of appetite.        omeprazole 20 MG CR capsule    priLOSEC    90 capsule    Take 1 capsule (20 mg) by mouth daily    Gastroesophageal reflux disease, esophagitis presence not specified       valACYclovir 500 MG tablet    VALTREX     Take 500 mg by mouth daily

## 2018-04-26 NOTE — PROGRESS NOTES
[facility]  Acute Care Visit    Patient Name: Tika Au   : 1933  MRN: [id number]    Place of Service: [facility]  DOS: 2018    CC: Vomiting in evening after supper 2-3x/week.     HPI:  Tika Au is a 84 year old female with PMH of multiple chronic medical concerns, who is seen today regarding nursing staff reports she has small to large episodes of vomiting x1 in evening after supper 2-3x/week.  This has apparently been happening for a few months now but increasing in frequency. Staff did note if she eats tomatoes she will have nausea/vomiting but they have been monitoring this very closely and she has not had any tomatoes.   Pt's  recently passed away so son Sd is here from [place] and visiting his mom today. He reports she has never had any food allergies that he is aware of.   Discussed plan of care.   Does not appear to have any abdominal pain and vomiting does not occur during the day.   No blood Present in vomitus.   Weight is very stable.   +coughing at times        Multidisciplinary notes, laboratory values, medications, vital signs, weight and orders arereviewed from nursing home records. I have reviewed the patient s medical history and updated the computerized patient record.     PMH:  Past Medical History:   Diagnosis Date     Alzheimer's disease     No Comments Provided     Hyperlipidemia     No Comments Provided     Hypothyroidism     No Comments Provided       Medications:  Current Outpatient Prescriptions   Medication     omeprazole (PRILOSEC) 20 MG CR capsule     acetaminophen (TYLENOL) 325 MG tablet     Elastic Bandages & Supports (MEDICAL COMPRESSION STOCKINGS) MISC     levothyroxine (SYNTHROID/LEVOTHROID) 50 MCG tablet     magnesium hydroxide (MILK OF MAGNESIA) 400 MG/5ML suspension     mirtazapine (REMERON) 7.5 MG TABS tablet     Multiple Vitamin (MULTI-VITAMINS) TABS     NUTRITIONAL SUPPLEMENT LIQD     valACYclovir (VALTREX) 500 MG tablet      No current facility-administered medications for this visit.        Medication reconciliation complete between Epic record and NH MAR.     Allergies:  No known allergies    Review of Systems:  Limited ROS due to dementia. See HPI.    Vital Signs:  Temp 97.9   Pulse 61   Respirations 16   /84   Oxygen Sats 95%   Weight 155.2#    Physical Exam:  Pleasant and alert without distress. Affect normal. Clapping hands and smiling. Some non-sensical talk.   Lungs clear to auscultation throughout. No wheezes or rales noted.   Cardiovascular regular, S1, S2 auscultated. No murmur noted.  Abdomen soft and without masses, no tenderness       Assessment/Plan:  (K21.9) Gastroesophageal reflux disease, esophagitis presence not specified  (primary encounter diagnosis)  Comment: Likely gerd--evening episodes of vomiting.   Discussed with son that we wouldn't recommend any further testing (EGD, etc). Comfort focus.  Plan: Start Prilosec 20 mg daily, Tums 1-2 tabs TID PRN heartburn, nausea, vomiting noted after meals.  Continue to monitor effectiveness of Prilosec.         A total of 25 minutes was spent with this patient, of which more than 50% was spent in counseling and/or coordination of care.     Nereyda Price ....................  4/26/2018   11:05 AM

## 2018-05-09 ENCOUNTER — RESULTS ONLY (OUTPATIENT)
Dept: LAB | Age: 83
End: 2018-05-09

## 2018-05-09 ENCOUNTER — NURSING HOME VISIT (OUTPATIENT)
Dept: GERIATRICS | Facility: OTHER | Age: 83
End: 2018-05-09
Attending: NURSE PRACTITIONER
Payer: MEDICARE

## 2018-05-09 ENCOUNTER — MEDICAL CORRESPONDENCE (OUTPATIENT)
Dept: HEALTH INFORMATION MANAGEMENT | Facility: OTHER | Age: 83
End: 2018-05-09

## 2018-05-09 DIAGNOSIS — I10 ESSENTIAL HYPERTENSION: ICD-10-CM

## 2018-05-09 DIAGNOSIS — K21.9 GASTROESOPHAGEAL REFLUX DISEASE WITHOUT ESOPHAGITIS: ICD-10-CM

## 2018-05-09 DIAGNOSIS — G30.1 LATE ONSET ALZHEIMER'S DISEASE WITHOUT BEHAVIORAL DISTURBANCE (H): ICD-10-CM

## 2018-05-09 DIAGNOSIS — F02.80 LATE ONSET ALZHEIMER'S DISEASE WITHOUT BEHAVIORAL DISTURBANCE (H): ICD-10-CM

## 2018-05-09 DIAGNOSIS — K04.7 TOOTH INFECTION: Primary | ICD-10-CM

## 2018-05-09 LAB
ANION GAP SERPL CALCULATED.3IONS-SCNC: 8 MMOL/L (ref 3–14)
BUN SERPL-MCNC: 12 MG/DL (ref 7–25)
CALCIUM SERPL-MCNC: 9.1 MG/DL (ref 8.6–10.3)
CHLORIDE SERPL-SCNC: 103 MMOL/L (ref 98–107)
CO2 SERPL-SCNC: 26 MMOL/L (ref 21–31)
CREAT SERPL-MCNC: 0.82 MG/DL (ref 0.6–1.2)
ERYTHROCYTE [DISTWIDTH] IN BLOOD BY AUTOMATED COUNT: 13.5 % (ref 10–15)
GFR SERPL CREATININE-BSD FRML MDRD: 66 ML/MIN/1.7M2
GLUCOSE SERPL-MCNC: 92 MG/DL (ref 70–105)
HCT VFR BLD AUTO: 40.5 % (ref 35–47)
HGB BLD-MCNC: 13.1 G/DL (ref 11.7–15.7)
MCH RBC QN AUTO: 29 PG (ref 26.5–33)
MCHC RBC AUTO-ENTMCNC: 32.3 G/DL (ref 31.5–36.5)
MCV RBC AUTO: 90 FL (ref 78–100)
PLATELET # BLD AUTO: 315 10E9/L (ref 150–450)
POTASSIUM SERPL-SCNC: 4.1 MMOL/L (ref 3.5–5.1)
RBC # BLD AUTO: 4.51 10E12/L (ref 3.8–5.2)
SODIUM SERPL-SCNC: 137 MMOL/L (ref 134–144)
TSH SERPL DL<=0.05 MIU/L-ACNC: 3.23 IU/ML (ref 0.34–5.6)
WBC # BLD AUTO: 7.7 10E9/L (ref 4–11)

## 2018-05-09 PROCEDURE — 99309 SBSQ NF CARE MODERATE MDM 30: CPT | Performed by: NURSE PRACTITIONER

## 2018-05-09 RX ORDER — AMOXICILLIN 500 MG/1
500 CAPSULE ORAL 3 TIMES DAILY
Qty: 18 CAPSULE | Refills: 0 | COMMUNITY
Start: 2018-05-09 | End: 2018-06-16

## 2018-05-09 NOTE — MR AVS SNAPSHOT
"              After Visit Summary   2018    Tika Au    MRN: [id number]           Patient Information     Date Of Birth          1933        Visit Information        Provider Department      2018 3:00 AM Nereyda Price APRN CNP [facility]        Today's Diagnoses     Tooth infection    -  1    Late onset Alzheimer's disease without behavioral disturbance        Essential hypertension        Gastroesophageal reflux disease without esophagitis           Follow-ups after your visit        Who to contact     If you have questions or need follow up information about today's clinic visit or your schedule please contact [facility] AND [facility] directly at [phone].  Normal or non-critical lab and imaging results will be communicated to you by MovingHealthhart, letter or phone within 4 business days after the clinic has received the results. If you do not hear from us within 7 days, please contact the clinic through MovingHealthhart or phone. If you have a critical or abnormal lab result, we will notify you by phone as soon as possible.  Submit refill requests through [employer] or call your pharmacy and they will forward the refill request to us. Please allow 3 business days for your refill to be completed.          Additional Information About Your Visit        MyChart Information     [employer] lets you send messages to your doctor, view your test results, renew your prescriptions, schedule appointments and more. To sign up, go to [email or web address] . Click on \"Log in\" on the left side of the screen, which will take you to the Welcome page. Then click on \"Sign up Now\" on the right side of the page.     You will be asked to enter the access code listed below, as well as some personal information. Please follow the directions to create your username and password.     Your access code is: [email or web address]  Expires: 2018 11:32 AM     Your access code will  in 90 days. If you need " help or a new code, please call your [place] clinic or [phone].        Care EveryWhere ID     This is your Care EveryWhere ID. This could be used by other organizations to access your [place] medical records  [id number]         Blood Pressure from Last 3 Encounters:   04/06/16 138/82   07/09/15 138/74   05/13/15 132/62    Weight from Last 3 Encounters:   07/09/15 162 lb (73.5 kg)   05/13/15 161 lb 6.4 oz (73.2 kg)   02/26/15 160 lb (72.6 kg)              Today, you had the following     No orders found for display       Primary Care Provider Office Phone # Fax #    Carlito Maria [phone]       [address] [facility] [address]        Equal Access to Services     [facility] CenterISABELA : Hadii jeffrey nunes hadasho Sokaren, waaxda luqadaha, qaybta kaalmada adepeggyyada, elsa mohr . So [facility] [phone].    ATENCIÓN: Si habla español, tiene a keller disposición servicios gratuitos de asistencia lingüística. [facility] [phone].    We comply with applicable federal civil rights laws and [place] laws. We do not discriminate on the basis of race, color, national origin, age, disability, sex, sexual orientation, or gender identity.            Thank you!     Thank you for choosing [facility] AND [facility]  for your care. Our goal is always to provide you with excellent care. Hearing back from our patients is one way we can continue to improve our services. Please take a few minutes to complete the written survey that you may receive in the mail after your visit with us. Thank you!             Your Updated Medication List - Protect others around you: Learn how to safely use, store and throw away your medicines at www.disposemymeds.org.          This list is accurate as of 5/9/18  8:46 PM.  Always use your most recent med list.                   Brand Name Dispense Instructions for use Diagnosis    acetaminophen 325 MG tablet    TYLENOL     Take 650 mg by mouth  every 4 hours as needed . Max acetaminophen dose: 4000mg in 24 hrs.        amoxicillin 500 MG capsule    AMOXIL    18 capsule    Take 1 capsule (500 mg) by mouth 3 times daily Give 1000 mg x1 loading dose today    Tooth infection       levothyroxine 50 MCG tablet    SYNTHROID/LEVOTHROID     Take 50 mcg by mouth daily        Medical Compression Stockings Misc      MISCELLANEOUS MEDICAL SUPPLY (GRADUATED COMPRESSION STOCKINGS). Mild compression stockings feet to knees on during day off at night        MILK OF MAGNESIA 400 MG/5ML suspension   Generic drug:  magnesium hydroxide      Take 1 Tablespoonful by mouth as needed for bowel management.        mirtazapine 7.5 MG Tabs tablet    REMERON     Take 7.5 mg by mouth At Bedtime        MULTI-VITAMINS Tabs      Take 1 tablet by mouth        NUTRITIONAL SUPPLEMENT Liqd      House supplement TID between meals for loss of appetite.        omeprazole 20 MG CR capsule    priLOSEC    90 capsule    Take 1 capsule (20 mg) by mouth daily    Gastroesophageal reflux disease, esophagitis presence not specified       valACYclovir 500 MG tablet    VALTREX     Take 500 mg by mouth daily

## 2018-05-10 NOTE — PROGRESS NOTES
[facility] Term [facility]  60 DAY RECERTIFICATION    Patient Name: Tika Au  YOB: 1933 Age: 84 year old  Medical RecordNumber: [id number]  Primary Provider: Dr. Maria    Advanced Directives: DNR/DNI comfort cares, call Sd son first    Date admitted to [facility]: 2015      HPI: Patient is seen today for 60 day evaluation for the following medical issues:  Dementia, hypertension, gerd, and an acute tooth infection.   Overall pt is doing well. Pleasant older lady.  recently past away at [facility] and family here to be with her from [place] and help with  arrangements.   Pt had been having episodes in evenings after supper vomiting. It seemed there was no warning. Episodes were intermittent. Trialed prilosec for possible Gerd and this seems to be helping.   Pt is generally happy, clapping hands and smiling. Today she is somewhat subdued. Nursing staff reported a possible tooth infection. Right lower cheek/jaw line started getting quite swollen last evening up to the size of half a baseball. It has now come down but still significantly swollen.   Pt has not been wanting to eat as much, not wanting to open mouth and when staff tried to look at her tooth, she tried to bite them and hit them. She typically is very compliant other than does not always like to be cleaned up.   Pt did have a low grade fever up to 100.7 this morning.         ALLERGIES:  No Known Allergies    Past Medical History:    has a past medical history of Alzheimer's disease; Hyperlipidemia; and Hypothyroidism.    Past Surgical History:   has a past surgical history that includes fracture surgery; Appendectomy open; Sigmoidoscopy flexible; and Extracapsular cataract extration with intraocular lens implant.    Family History:  family history includes Other - See Comments in her brother, father, and mother.    Social History:   reports that she quit smoking about 19 months ago. Her  smoking use included Cigarettes. She has never used smokeless tobacco. She reports that she does not drink alcohol.    Immunizations:     There is no immunization history on file for this patient.    Current Medications:   Current Outpatient Prescriptions   Medication     acetaminophen (TYLENOL) 325 MG tablet     Elastic Bandages & Supports (MEDICAL COMPRESSION STOCKINGS) MISC     levothyroxine (SYNTHROID/LEVOTHROID) 50 MCG tablet     magnesium hydroxide (MILK OF MAGNESIA) 400 MG/5ML suspension     mirtazapine (REMERON) 7.5 MG TABS tablet     Multiple Vitamin (MULTI-VITAMINS) TABS     NUTRITIONAL SUPPLEMENT LIQD     omeprazole (PRILOSEC) 20 MG CR capsule     valACYclovir (VALTREX) 500 MG tablet     No current facility-administered medications for this visit.      Medication reconciliation completed between [facility] record and NH MAR.     Diagnostics/Labs (reviewed today):  5/9/18:  Thyrotropin 3.23  Lab Results   Component Value Date    WBC 7.7 05/09/2018     Lab Results   Component Value Date    RBC 4.51 05/09/2018     Lab Results   Component Value Date    HGB 13.1 05/09/2018     Lab Results   Component Value Date    HCT 40.5 05/09/2018     No components found for: MCT  Lab Results   Component Value Date    MCV 90 05/09/2018     Lab Results   Component Value Date    MCH 29.0 05/09/2018     Lab Results   Component Value Date    MCHC 32.3 05/09/2018     Lab Results   Component Value Date    RDW 13.5 05/09/2018     Lab Results   Component Value Date     05/09/2018     Last Basic Metabolic Panel:  Lab Results   Component Value Date     05/09/2018      Lab Results   Component Value Date    POTASSIUM 4.1 05/09/2018     Lab Results   Component Value Date    CHLORIDE 103 05/09/2018     Lab Results   Component Value Date    VIRGIE 9.1 05/09/2018     Lab Results   Component Value Date    CO2 26 05/09/2018     Lab Results   Component Value Date    BUN 12 05/09/2018     Lab Results   Component Value Date    CR 0.82  05/09/2018     Lab Results   Component Value Date    GLC 92 05/09/2018         Current Diet: Mechanical soft diet    Review Of Systems:    Limited ROS due to dementia. See HPI.    Vital Signs: (obtained from nursing home record)  Temp 100.7  Pulse 63   Resp 18   /63  O2 Sats 95%   Weight 150 #     Objective:  Pleasant and alert with mild distress likely due to tooth pain. Affect subdued today.   Skin color pink. Mucous membranes moist.   Right cheek/jaw line swollen and tender to touch.   Right lower 3rd tooth from back (#46), possibly #45 with what appears to be pus, inflammation  Lungs clear to auscultation throughout. No wheezes or rales noted.   Cardiovascular regular, S1, [id number]. No murmur noted.  Abdomen soft and without masses, tenderness   Extremities without edema.  Full range of motion of upper and lower extremities         Assessment and Plan:  (K04.7) Tooth infection  (primary encounter diagnosis)  Comment: Acute  Plan: Start amoxicillin 1000 mg x 1 dose today, then 500 mg TID x 5 days.   Dental referral for possible extraction.     (G30.1,  F02.80) Late onset Alzheimer's disease without behavioral disturbance  Comment: Chronic  Plan: Non-pharm interventions, comfort cares, remeron    (I10) Essential hypertension  Comment: Stable  Plan: No blood pressure medications    (K21.9) Gastroesophageal reflux disease without esophagitis  Comment: New onset-started prilosec 2 weeks ago  Plan: Doing well-continue prilosec         Multidisciplinary notes, laboratory values, medications, vital signs, weight and orders are reviewed from nursing home records. I have reviewed the patient s medical history and updated the computerized patient record.     A total of 25 minutes was spent with this patient, of which more than 50% was spent in counseling and/or coordination of care.   Nereyda Price  5/9/2018 8:01 PM

## 2018-06-05 ENCOUNTER — NURSING HOME VISIT (OUTPATIENT)
Dept: GERIATRICS | Facility: OTHER | Age: 83
End: 2018-06-05
Attending: NURSE PRACTITIONER
Payer: MEDICARE

## 2018-06-05 DIAGNOSIS — B37.31 CANDIDIASIS OF VAGINA: Primary | ICD-10-CM

## 2018-06-05 DIAGNOSIS — B00.9 RECURRENT HERPES SIMPLEX: ICD-10-CM

## 2018-06-05 PROCEDURE — 99309 SBSQ NF CARE MODERATE MDM 30: CPT | Performed by: NURSE PRACTITIONER

## 2018-06-05 NOTE — MR AVS SNAPSHOT
"              After Visit Summary   2018    Tika Au    MRN: [id number]           Patient Information     Date Of Birth          1933        Visit Information        Provider Department      2018 3:30 AM Nereyda Price APRN CNP [facility]        Today's Diagnoses     Candidiasis of vagina    -  1    Recurrent herpes simplex           Follow-ups after your visit        Who to contact     If you have questions or need follow up information about today's clinic visit or your schedule please contact [facility] directly at [phone].  Normal or non-critical lab and imaging results will be communicated to you by [employer], letter or phone within 4 business days after the clinic has received the results. If you do not hear from us within 7 days, please contact the clinic through [employer] or phone. If you have a critical or abnormal lab result, we will notify you by phone as soon as possible.  Submit refill requests through [employer] or call your pharmacy and they will forward the refill request to us. Please allow 3 business days for your refill to be completed.          Additional Information About Your Visit        MyChart Information     [employer] lets you send messages to your doctor, view your test results, renew your prescriptions, schedule appointments and more. To sign up, go to [email or web address] . Click on \"Log in\" on the left side of the screen, which will take you to the Welcome page. Then click on \"Sign up Now\" on the right side of the page.     You will be asked to enter the access code listed below, as well as some personal information. Please follow the directions to create your username and password.     Your access code is: [email or web address]  Expires: 2018 11:32 AM     Your access code will  in 90 days. If you need help or a new code, please call your [place] clinic or [phone].        Care EveryWhere ID     This is your Care " EveryWhere ID. This could be used by other organizations to access your [place] medical records  [id number]         Blood Pressure from Last 3 Encounters:   04/06/16 138/82   07/09/15 138/74   05/13/15 132/62    Weight from Last 3 Encounters:   07/09/15 162 lb (73.5 kg)   05/13/15 161 lb 6.4 oz (73.2 kg)   02/26/15 160 lb (72.6 kg)              Today, you had the following     No orders found for display       Primary Care Provider Office Phone # Fax #    Carlito Maria [phone] [address] [facility] [address]        Equal Access to Services     ERICA [facility] CenterISABELA : Conor Aguilera, wacharlotte godoy, qaybta kaalmada niyah, elsa mohr . So [facility] [phone].    ATENCIÓN: Si habla español, tiene a keller disposición servicios gratuitos de asistencia lingüística. Llame al [phone].    We comply with applicable federal civil rights laws and [place] laws. We do not discriminate on the basis of race, color, national origin, age, disability, sex, sexual orientation, or gender identity.            Thank you!     Thank you for choosing [facility] AND [facility]  for your care. Our goal is always to provide you with excellent care. Hearing back from our patients is one way we can continue to improve our services. Please take a few minutes to complete the written survey that you may receive in the mail after your visit with us. Thank you!             Your Updated Medication List - Protect others around you: Learn how to safely use, store and throw away your medicines at www.disposemymeds.org.          This list is accurate as of 6/5/18 11:59 PM.  Always use your most recent med list.                   Brand Name Dispense Instructions for use Diagnosis    acetaminophen 325 MG tablet    TYLENOL     Take 650 mg by mouth every 4 hours as needed . Max acetaminophen dose: 4000mg in 24 hrs.        levothyroxine 50 MCG tablet     SYNTHROID/LEVOTHROID     Take 50 mcg by mouth daily        Medical Compression Stockings Misc      MISCELLANEOUS MEDICAL SUPPLY (GRADUATED COMPRESSION STOCKINGS). Mild compression stockings feet to knees on during day off at night        MILK OF MAGNESIA 400 MG/5ML suspension   Generic drug:  magnesium hydroxide      Take 1 Tablespoonful by mouth as needed for bowel management.        mirtazapine 7.5 MG Tabs tablet    REMERON     Take 7.5 mg by mouth At Bedtime        MULTI-VITAMINS Tabs      Take 1 tablet by mouth        NUTRITIONAL SUPPLEMENT Liqd      House supplement TID between meals for loss of appetite.        omeprazole 20 MG CR capsule    priLOSEC    90 capsule    Take 1 capsule (20 mg) by mouth daily    Gastroesophageal reflux disease, esophagitis presence not specified       valACYclovir 500 MG tablet    VALTREX     Take 500 mg by mouth daily

## 2018-06-16 NOTE — PROGRESS NOTES
[facility] Care  Acute Care Visit    Patient Name: Tika Au   : 1933  MRN: [id number]    Place of Service: [facility]  DOS: 2018    CC: possible herpes outbreak, vaginal discharge    HPI:  Tika Au is a 84 year old female with PMH of multiple chronic medical concerns, who is seen today regarding vaginal discharge and possible herpes outbreak. Pt has a history of herpes outbreak and currently on maintenance dose of valtrex. Also recent hx of antibiotic use amoxicillin due to abscessed tooth and this puts her at increased risk for fungal infection with reports of creamy, foul smelling vaginal discharge. Difficult to assess other symptoms of itchiness or pain but pt does not grimace with assessment of vaginal area.   As of now, tooth swelling and pain have resolved with amoxicillin and dental visit is scheduled for a follow up.       Multidisciplinary notes, laboratory values, medications, vital signs, weight and orders arereviewed from nursing home records. I have reviewed the patient s medical history and updated the computerized patient record.     PMH:  Past Medical History:   Diagnosis Date     Alzheimer's disease     No Comments Provided     Hyperlipidemia     No Comments Provided     Hypothyroidism     No Comments Provided       Medications:  Current Outpatient Prescriptions   Medication     acetaminophen (TYLENOL) 325 MG tablet     Elastic Bandages & Supports (MEDICAL COMPRESSION STOCKINGS) MISC     levothyroxine (SYNTHROID/LEVOTHROID) 50 MCG tablet     magnesium hydroxide (MILK OF MAGNESIA) 400 MG/5ML suspension     mirtazapine (REMERON) 7.5 MG TABS tablet     Multiple Vitamin (MULTI-VITAMINS) TABS     NUTRITIONAL SUPPLEMENT LIQD     omeprazole (PRILOSEC) 20 MG CR capsule     valACYclovir (VALTREX) 500 MG tablet     No current facility-administered medications for this visit.        Medication reconciliation complete between [facility] record and NH MAR.      Allergies:  No Known Allergies    Review of Systems:  Limited ROS due to dementia. See HPI.     Vital Signs:  Temp 97.2   Pulse 76   Respirations 18   /70   Oxygen Sats 97%   Weight 157#    Physical Exam:  Pleasant elderly lady, smiles.   Small blister area, one spot possible for herpes   Creamy/yellow vaginal discharge present  Mild foul odor.       Assessment/Plan:  (B37.3) Candidiasis of vagina  (primary encounter diagnosis)  Comment: Hx of recent antibiotic use, acute  Plan: Diflucan 150 mg po x 1    (B00.9) Recurrent herpes simplex  Comment: Possible one small area herpes outbreak vaginally  Plan: Will monitor closely. Continue maintenance valtrex dose 500 mg daily        A total of 26 minutes was spent with this patient, of which more than 50% was spent in counseling and/or coordination of care.     Neredya Price ....................  6/16/2018   1:07 PM

## 2018-07-20 ENCOUNTER — APPOINTMENT (OUTPATIENT)
Dept: GERIATRICS | Facility: OTHER | Age: 83
End: 2018-07-20
Attending: FAMILY MEDICINE
Payer: MEDICARE

## 2018-07-23 NOTE — PROGRESS NOTES
Patient Information     Patient Name  Tika Au MRN  [id number] Sex  Female   1933      Letter by Carlito Maria MD at      Author:  Carlito Maria MD Service:  (none) Author Type:  (none)    Filed:   Encounter Date:  2017 Status:  (Other)           Tika Au  C/o [address] [address]          2017    To whom it may concern    Mrs. Au has been under my care for the past couple of years.  Unfortunately she suffers from moderate to severe Alzheimer's dementia.  As such she cannot manage her financial affairs and requires the aid of her son, Sd.      If you have any further questions or problems contact my office at  [phone].    Thank you,    Carlito Maria MD

## 2018-09-18 ENCOUNTER — NURSING HOME VISIT (OUTPATIENT)
Dept: GERIATRICS | Facility: OTHER | Age: 83
End: 2018-09-18
Attending: NURSE PRACTITIONER
Payer: MEDICARE

## 2018-09-18 DIAGNOSIS — K21.9 GASTROESOPHAGEAL REFLUX DISEASE WITHOUT ESOPHAGITIS: ICD-10-CM

## 2018-09-18 DIAGNOSIS — G30.1 LATE ONSET ALZHEIMER'S DISEASE WITH BEHAVIORAL DISTURBANCE (H): Primary | ICD-10-CM

## 2018-09-18 DIAGNOSIS — F02.818 LATE ONSET ALZHEIMER'S DISEASE WITH BEHAVIORAL DISTURBANCE (H): Primary | ICD-10-CM

## 2018-09-18 DIAGNOSIS — I10 ESSENTIAL HYPERTENSION: ICD-10-CM

## 2018-09-18 PROCEDURE — 99309 SBSQ NF CARE MODERATE MDM 30: CPT | Performed by: NURSE PRACTITIONER

## 2018-09-18 NOTE — MR AVS SNAPSHOT
After Visit Summary   9/18/2018    Tika Au    MRN: [id number]           Patient Information     Date Of Birth          6/29/1933        Visit Information        Provider Department      9/18/2018 3:30 AM Nereyda Price APRN CNP [facility]        Today's Diagnoses     Late onset Alzheimer's disease with behavioral disturbance    -  1    Gastroesophageal reflux disease without esophagitis        Essential hypertension           Follow-ups after your visit        Who to contact     If you have questions or need follow up information about today's clinic visit or your schedule please contact [facility] directly at [phone].  Normal or non-critical lab and imaging results will be communicated to you by MyChart, letter or phone within 4 business days after the clinic has received the results. If you do not hear from us within 7 days, please contact the clinic through MyChart or phone. If you have a critical or abnormal lab result, we will notify you by phone as soon as possible.  Submit refill requests through [employer] or call your pharmacy and they will forward the refill request to us. Please allow 3 business days for your refill to be completed.          Additional Information About Your Visit        Care EveryWhere ID     This is your Care EveryWhere ID. This could be used by other organizations to access your [place] medical records  [id number]         Blood Pressure from Last 3 Encounters:   04/06/16 138/82   07/09/15 138/74   05/13/15 132/62    Weight from Last 3 Encounters:   07/09/15 162 lb (73.5 kg)   05/13/15 161 lb 6.4 oz (73.2 kg)   02/26/15 160 lb (72.6 kg)              Today, you had the following     No orders found for display       Primary Care Provider Office Phone # Fax #    Carlito Maria [phone] [address]  [facility] [address]        Equal Access to Services     ROMAINE MELÉNDEZ: Conor  jeffrey Aguilera, waaxda luqadaha, qaybta kaalmada niyah, elsa fayin hayaarabia ropeggy benito laangelicarabia shania. So [facility] [phone].    ATENCIÓN: Si habla español, tiene a keller disposición servicios gratuitos de asistencia lingüística. Williams al [phone].    We comply with applicable federal civil rights laws and [place] laws. We do not discriminate on the basis of race, color, national origin, age, disability, sex, sexual orientation, or gender identity.            Thank you!     Thank you for choosing [facility] AND [facility]  for your care. Our goal is always to provide you with excellent care. Hearing back from our patients is one way we can continue to improve our services. Please take a few minutes to complete the written survey that you may receive in the mail after your visit with us. Thank you!             Your Updated Medication List - Protect others around you: Learn how to safely use, store and throw away your medicines at www.disposemymeds.org.          This list is accurate as of 9/18/18 11:59 PM.  Always use your most recent med list.                   Brand Name Dispense Instructions for use Diagnosis    acetaminophen 325 MG tablet    TYLENOL     Take 650 mg by mouth every 4 hours as needed . Max acetaminophen dose: 4000mg in 24 hrs.        levothyroxine 50 MCG tablet    SYNTHROID/LEVOTHROID     Take 50 mcg by mouth daily        Medical Compression Stockings Misc      MISCELLANEOUS MEDICAL SUPPLY (GRADUATED COMPRESSION STOCKINGS). Mild compression stockings feet to knees on during day off at night        MILK OF MAGNESIA 400 MG/5ML suspension   Generic drug:  magnesium hydroxide      Take 1 Tablespoonful by mouth as needed for bowel management.        mirtazapine 7.5 MG Tabs tablet    REMERON     Take 7.5 mg by mouth At Bedtime        MULTI-VITAMINS Tabs      Take 1 tablet by mouth        NUTRITIONAL SUPPLEMENT Liqd      House supplement TID between meals for loss of appetite.         omeprazole 20 MG CR capsule    priLOSEC    90 capsule    Take 1 capsule (20 mg) by mouth daily    Gastroesophageal reflux disease, esophagitis presence not specified       valACYclovir 500 MG tablet    VALTREX     Take 500 mg by mouth daily

## 2018-09-22 NOTE — PROGRESS NOTES
[facility]   [facility]  60 DAY RECERTIFICATION    Patient Name: Tika Au  YOB: 1933 Age: 85 year old  Medical RecordNumber: [id number]  Primary Provider:     Advanced Directives: DNR/DNI comfort cares    Date admitted to [facility]: 7/2015      HPI: Patient is seen today for 60 day evaluation for the following medical issues:  alzheimers dementia, gerd, hypertension  Overall pt is doing well. There was some talk that she may get moved to [facility] but no further progress has been made. In July she had a tooth infection and was to have it pulled but antibiotic cleared infection and pt ended up not having it pulled as she had no further problems and she couldn't get in for a month. She currently has a dental follow-up with Dr. Gandhi 9/21.   Pt had been what seemed to be vaginal itching and was started on vagisil and this seems to be helping.   Pt is overall content and always seems happy and non-sensical talking.     ALLERGIES:  No Known Allergies    Past Medical History:    has a past medical history of Alzheimer's disease; Hyperlipidemia; and Hypothyroidism.    Past Surgical History:   has a past surgical history that includes fracture surgery; Appendectomy open; Sigmoidoscopy flexible; and Extracapsular cataract extration with intraocular lens implant.    Family History:  family history includes Other - See Comments in her brother, father, and mother.    Social History:   reports that she quit smoking about 1 years ago. Her smoking use included Cigarettes. She has never used smokeless tobacco. She reports that she does not drink alcohol.    Immunizations:     There is no immunization history on file for this patient.    Current Medications:   Current Outpatient Prescriptions   Medication     acetaminophen (TYLENOL) 325 MG tablet     Elastic Bandages & Supports (MEDICAL COMPRESSION STOCKINGS) MISC     levothyroxine (SYNTHROID/LEVOTHROID) 50 MCG tablet      magnesium hydroxide (MILK OF MAGNESIA) 400 MG/5ML suspension     mirtazapine (REMERON) 7.5 MG TABS tablet     Multiple Vitamin (MULTI-VITAMINS) TABS     NUTRITIONAL SUPPLEMENT LIQD     omeprazole (PRILOSEC) 20 MG CR capsule     valACYclovir (VALTREX) 500 MG tablet     No current facility-administered medications for this visit.      Medication reconciliation completed between [facility] record and NH MAR.       Current Diet: Mechanical soft diet    Review Of Systems:    Limited ROS due to dementia    Vital Signs: (obtained from nursing home record)  Temp 98.2   Pulse 58  Resp 17  /54   O2 Sats 96%   Weight 155.8 # stable    Objective:     Pleasant and alert without distress. Affect confused.   Sclera nonicteric, conjunctiva non-inflamed.  Skin color pink. Mucous membranes moist.   Neck supple and without adenopathy  Lungs clear to auscultation throughout. No wheezes or rales noted.   Cardiovascular regular, S1, [id number]. No murmur noted.  Abdomen soft and without masses, tenderness   Extremities without edema. DPPT  Able to move upper and lower extremities        Assessment and Plan:  (G30.1,  F02.81) Late onset Alzheimer's disease with behavioral disturbance  (primary encounter diagnosis)  Comment: Progressive  Plan: Supportive measures, comfort cares    (K21.9) Gastroesophageal reflux disease without esophagitis  Comment: stable--no further vomiting in evening  Plan: Continue prilosec 20 mg every other day    (I10) Essential hypertension  Comment: stable  Plan: Currently taking no medications        Multidisciplinary notes, laboratory values, medications, vital signs, weight and orders are reviewed from nursing home records. I have reviewed the patient s medical history and updated the computerized patient record.     A total of 25 minutes was spent with this patient, of which more than 50% was spent in counseling and/or coordination of care.   KAREN Orona  9/21/2018 10:57 PM

## 2018-11-19 ENCOUNTER — APPOINTMENT (OUTPATIENT)
Dept: GERIATRICS | Facility: OTHER | Age: 83
End: 2018-11-19
Attending: FAMILY MEDICINE
Payer: MEDICARE

## 2019-01-01 ENCOUNTER — NURSING HOME VISIT (OUTPATIENT)
Dept: GERIATRICS | Facility: OTHER | Age: 84
End: 2019-01-01
Attending: NURSE PRACTITIONER
Payer: MEDICARE

## 2019-01-01 ENCOUNTER — NURSING HOME VISIT (OUTPATIENT)
Dept: GERIATRICS | Facility: OTHER | Age: 84
End: 2019-01-01
Attending: FAMILY MEDICINE
Payer: MEDICARE

## 2019-01-01 ENCOUNTER — DOCUMENTATION ONLY (OUTPATIENT)
Dept: OTHER | Facility: CLINIC | Age: 84
End: 2019-01-01

## 2019-01-01 ENCOUNTER — MEDICAL CORRESPONDENCE (OUTPATIENT)
Dept: LAB | Facility: OTHER | Age: 84
End: 2019-01-01

## 2019-01-01 ENCOUNTER — HOSPITAL ENCOUNTER (INPATIENT)
Facility: OTHER | Age: 84
LOS: 9 days | Discharge: NURSING, REHAB OR LONG-TERM CARE | DRG: 683 | End: 2019-12-20
Attending: FAMILY MEDICINE | Admitting: INTERNAL MEDICINE
Payer: MEDICARE

## 2019-01-01 ENCOUNTER — RESULTS ONLY (OUTPATIENT)
Dept: LAB | Age: 84
End: 2019-01-01

## 2019-01-01 ENCOUNTER — TELEPHONE (OUTPATIENT)
Dept: FAMILY MEDICINE | Facility: OTHER | Age: 84
End: 2019-01-01

## 2019-01-01 ENCOUNTER — APPOINTMENT (OUTPATIENT)
Dept: GENERAL RADIOLOGY | Facility: OTHER | Age: 84
DRG: 683 | End: 2019-01-01
Attending: FAMILY MEDICINE
Payer: MEDICARE

## 2019-01-01 ENCOUNTER — MEDICAL CORRESPONDENCE (OUTPATIENT)
Dept: HEALTH INFORMATION MANAGEMENT | Facility: OTHER | Age: 84
End: 2019-01-01

## 2019-01-01 ENCOUNTER — ANESTHESIA (OUTPATIENT)
Dept: MEDSURG UNIT | Facility: OTHER | Age: 84
DRG: 683 | End: 2019-01-01
Payer: MEDICARE

## 2019-01-01 ENCOUNTER — APPOINTMENT (OUTPATIENT)
Dept: SPEECH THERAPY | Facility: OTHER | Age: 84
DRG: 683 | End: 2019-01-01
Attending: INTERNAL MEDICINE
Payer: MEDICARE

## 2019-01-01 ENCOUNTER — ANESTHESIA EVENT (OUTPATIENT)
Dept: MEDSURG UNIT | Facility: OTHER | Age: 84
DRG: 683 | End: 2019-01-01
Payer: MEDICARE

## 2019-01-01 ENCOUNTER — TELEPHONE (OUTPATIENT)
Dept: CARE COORDINATION | Facility: CLINIC | Age: 84
End: 2019-01-01

## 2019-01-01 ENCOUNTER — APPOINTMENT (OUTPATIENT)
Dept: SPEECH THERAPY | Facility: OTHER | Age: 84
DRG: 683 | End: 2019-01-01
Payer: MEDICARE

## 2019-01-01 ENCOUNTER — OFFICE VISIT (OUTPATIENT)
Dept: FAMILY MEDICINE | Facility: OTHER | Age: 84
End: 2019-01-01
Attending: FAMILY MEDICINE
Payer: MEDICARE

## 2019-01-01 ENCOUNTER — OFFICE VISIT (OUTPATIENT)
Dept: FAMILY MEDICINE | Facility: OTHER | Age: 84
DRG: 683 | End: 2019-01-01
Attending: INTERNAL MEDICINE
Payer: MEDICARE

## 2019-01-01 VITALS
HEART RATE: 53 BPM | DIASTOLIC BLOOD PRESSURE: 83 MMHG | TEMPERATURE: 97.9 F | HEIGHT: 62 IN | WEIGHT: 134.92 LBS | OXYGEN SATURATION: 95 % | SYSTOLIC BLOOD PRESSURE: 147 MMHG | RESPIRATION RATE: 18 BRPM | BODY MASS INDEX: 24.83 KG/M2

## 2019-01-01 VITALS
WEIGHT: 143 LBS | DIASTOLIC BLOOD PRESSURE: 72 MMHG | HEART RATE: 82 BPM | OXYGEN SATURATION: 94 % | RESPIRATION RATE: 16 BRPM | BODY MASS INDEX: 26.16 KG/M2 | SYSTOLIC BLOOD PRESSURE: 138 MMHG | TEMPERATURE: 97.4 F

## 2019-01-01 VITALS
RESPIRATION RATE: 16 BRPM | HEART RATE: 58 BPM | OXYGEN SATURATION: 92 % | TEMPERATURE: 98.9 F | SYSTOLIC BLOOD PRESSURE: 124 MMHG | DIASTOLIC BLOOD PRESSURE: 82 MMHG

## 2019-01-01 VITALS
BODY MASS INDEX: 26.46 KG/M2 | RESPIRATION RATE: 18 BRPM | TEMPERATURE: 97 F | SYSTOLIC BLOOD PRESSURE: 157 MMHG | HEIGHT: 62 IN | DIASTOLIC BLOOD PRESSURE: 87 MMHG | WEIGHT: 143.8 LBS | HEART RATE: 90 BPM | OXYGEN SATURATION: 94 %

## 2019-01-01 DIAGNOSIS — Z78.9 NURSING HOME RESIDENT: ICD-10-CM

## 2019-01-01 DIAGNOSIS — N17.9 ACUTE KIDNEY INJURY (H): ICD-10-CM

## 2019-01-01 DIAGNOSIS — G30.1 LATE ONSET ALZHEIMER'S DISEASE WITH BEHAVIORAL DISTURBANCE (H): ICD-10-CM

## 2019-01-01 DIAGNOSIS — F02.80 DEMENTIA IN OTHER DISEASES CLASSIFIED ELSEWHERE WITHOUT BEHAVIORAL DISTURBANCE: ICD-10-CM

## 2019-01-01 DIAGNOSIS — M26.609 TMJ (TEMPOROMANDIBULAR JOINT SYNDROME): Primary | ICD-10-CM

## 2019-01-01 DIAGNOSIS — Z51.5 COMFORT MEASURES ONLY STATUS: ICD-10-CM

## 2019-01-01 DIAGNOSIS — E03.9 ACQUIRED HYPOTHYROIDISM: Primary | ICD-10-CM

## 2019-01-01 DIAGNOSIS — E86.0 DEHYDRATION: ICD-10-CM

## 2019-01-01 DIAGNOSIS — F02.818 LATE ONSET ALZHEIMER'S DISEASE WITH BEHAVIORAL DISTURBANCE (H): Primary | ICD-10-CM

## 2019-01-01 DIAGNOSIS — K21.9 GASTROESOPHAGEAL REFLUX DISEASE WITHOUT ESOPHAGITIS: ICD-10-CM

## 2019-01-01 DIAGNOSIS — Z87.891 PERSONAL HISTORY OF TOBACCO USE, PRESENTING HAZARDS TO HEALTH: ICD-10-CM

## 2019-01-01 DIAGNOSIS — E03.9 HYPOTHYROIDISM, UNSPECIFIED TYPE: ICD-10-CM

## 2019-01-01 DIAGNOSIS — N17.9 ACUTE RENAL FAILURE, UNSPECIFIED ACUTE RENAL FAILURE TYPE (H): ICD-10-CM

## 2019-01-01 DIAGNOSIS — F02.818 LATE ONSET ALZHEIMER'S DISEASE WITH BEHAVIORAL DISTURBANCE (H): ICD-10-CM

## 2019-01-01 DIAGNOSIS — S03.00XA DISLOCATION OF TEMPOROMANDIBULAR JOINT, INITIAL ENCOUNTER: Primary | ICD-10-CM

## 2019-01-01 DIAGNOSIS — E87.0 HYPERNATREMIA: ICD-10-CM

## 2019-01-01 DIAGNOSIS — G30.1 LATE ONSET ALZHEIMER'S DISEASE WITHOUT BEHAVIORAL DISTURBANCE (H): ICD-10-CM

## 2019-01-01 DIAGNOSIS — T83.511A URINARY TRACT INFECTION ASSOCIATED WITH INDWELLING URETHRAL CATHETER, INITIAL ENCOUNTER (H): ICD-10-CM

## 2019-01-01 DIAGNOSIS — S03.00XS TMJ (DISLOCATION OF TEMPOROMANDIBULAR JOINT), SEQUELA: Primary | ICD-10-CM

## 2019-01-01 DIAGNOSIS — G30.1 LATE ONSET ALZHEIMER'S DISEASE WITH BEHAVIORAL DISTURBANCE (H): Primary | ICD-10-CM

## 2019-01-01 DIAGNOSIS — E87.0 HYPEROSMOLALITY AND/OR HYPERNATREMIA: ICD-10-CM

## 2019-01-01 DIAGNOSIS — Z79.899 ENCOUNTER FOR LONG-TERM (CURRENT) USE OF OTHER MEDICATIONS: ICD-10-CM

## 2019-01-01 DIAGNOSIS — I10 ESSENTIAL (PRIMARY) HYPERTENSION: ICD-10-CM

## 2019-01-01 DIAGNOSIS — R60.0 BILATERAL LOWER EXTREMITY EDEMA: ICD-10-CM

## 2019-01-01 DIAGNOSIS — I10 ESSENTIAL HYPERTENSION: ICD-10-CM

## 2019-01-01 DIAGNOSIS — F02.80 LATE ONSET ALZHEIMER'S DISEASE WITHOUT BEHAVIORAL DISTURBANCE (H): ICD-10-CM

## 2019-01-01 DIAGNOSIS — B00.9 RECURRENT HERPES SIMPLEX: Primary | ICD-10-CM

## 2019-01-01 DIAGNOSIS — E03.9 ACQUIRED HYPOTHYROIDISM: ICD-10-CM

## 2019-01-01 DIAGNOSIS — N39.0 URINARY TRACT INFECTION ASSOCIATED WITH INDWELLING URETHRAL CATHETER, INITIAL ENCOUNTER (H): ICD-10-CM

## 2019-01-01 LAB
ALBUMIN UR-MCNC: 30 MG/DL
ANION GAP SERPL CALCULATED.3IONS-SCNC: 10 MMOL/L (ref 3–14)
ANION GAP SERPL CALCULATED.3IONS-SCNC: 10 MMOL/L (ref 3–14)
ANION GAP SERPL CALCULATED.3IONS-SCNC: 11 MMOL/L (ref 3–14)
ANION GAP SERPL CALCULATED.3IONS-SCNC: 12 MMOL/L (ref 3–14)
ANION GAP SERPL CALCULATED.3IONS-SCNC: 13 MMOL/L (ref 3–14)
ANION GAP SERPL CALCULATED.3IONS-SCNC: 18 MMOL/L (ref 3–14)
ANION GAP SERPL CALCULATED.3IONS-SCNC: 18 MMOL/L (ref 3–14)
ANION GAP SERPL CALCULATED.3IONS-SCNC: 7 MMOL/L (ref 3–14)
ANION GAP SERPL CALCULATED.3IONS-SCNC: 7 MMOL/L (ref 3–14)
ANION GAP SERPL CALCULATED.3IONS-SCNC: 8 MMOL/L (ref 3–14)
ANION GAP SERPL CALCULATED.3IONS-SCNC: 9 MMOL/L (ref 3–14)
ANION GAP SERPL CALCULATED.3IONS-SCNC: NORMAL MMOL/L (ref 6–17)
ANION GAP SERPL CALCULATED.3IONS-SCNC: NORMAL MMOL/L (ref 6–17)
APPEARANCE UR: CLEAR
BACTERIA #/AREA URNS HPF: ABNORMAL /HPF
BACTERIA SPEC CULT: ABNORMAL
BASOPHILS # BLD AUTO: 0 10E9/L (ref 0–0.2)
BASOPHILS # BLD AUTO: 0 10E9/L (ref 0–0.2)
BASOPHILS NFR BLD AUTO: 0.2 %
BASOPHILS NFR BLD AUTO: 0.4 %
BILIRUB UR QL STRIP: NEGATIVE
BUN SERPL-MCNC: 12 MG/DL (ref 7–25)
BUN SERPL-MCNC: 14 MG/DL (ref 7–25)
BUN SERPL-MCNC: 14 MG/DL (ref 7–25)
BUN SERPL-MCNC: 15 MG/DL (ref 7–25)
BUN SERPL-MCNC: 16 MG/DL (ref 7–25)
BUN SERPL-MCNC: 17 MG/DL (ref 7–25)
BUN SERPL-MCNC: 20 MG/DL (ref 7–25)
BUN SERPL-MCNC: 23 MG/DL (ref 7–25)
BUN SERPL-MCNC: 28 MG/DL (ref 7–25)
BUN SERPL-MCNC: 30 MG/DL (ref 7–25)
BUN SERPL-MCNC: 32 MG/DL (ref 7–25)
BUN SERPL-MCNC: 34 MG/DL (ref 7–25)
BUN SERPL-MCNC: 35 MG/DL (ref 7–25)
BUN SERPL-MCNC: 44 MG/DL (ref 7–25)
BUN SERPL-MCNC: 52 MG/DL (ref 7–25)
BUN SERPL-MCNC: 61 MG/DL (ref 7–25)
BUN SERPL-MCNC: 72 MG/DL (ref 7–25)
BUN SERPL-MCNC: 83 MG/DL (ref 7–25)
BUN SERPL-MCNC: 88 MG/DL (ref 7–25)
BUN SERPL-MCNC: 9 MG/DL (ref 7–25)
BUN SERPL-MCNC: NORMAL MG/DL (ref 7–30)
BUN SERPL-MCNC: NORMAL MG/DL (ref 7–30)
C DIFF TOX B STL QL: NEGATIVE
CALCIUM SERPL-MCNC: 7.8 MG/DL (ref 8.6–10.3)
CALCIUM SERPL-MCNC: 7.8 MG/DL (ref 8.6–10.3)
CALCIUM SERPL-MCNC: 8.1 MG/DL (ref 8.6–10.3)
CALCIUM SERPL-MCNC: 8.2 MG/DL (ref 8.6–10.3)
CALCIUM SERPL-MCNC: 8.3 MG/DL (ref 8.6–10.3)
CALCIUM SERPL-MCNC: 8.4 MG/DL (ref 8.6–10.3)
CALCIUM SERPL-MCNC: 8.9 MG/DL (ref 8.6–10.3)
CALCIUM SERPL-MCNC: 9 MG/DL (ref 8.6–10.3)
CALCIUM SERPL-MCNC: 9.3 MG/DL (ref 8.6–10.3)
CALCIUM SERPL-MCNC: 9.8 MG/DL (ref 8.6–10.3)
CALCIUM SERPL-MCNC: NORMAL MG/DL (ref 8.5–10.1)
CALCIUM SERPL-MCNC: NORMAL MG/DL (ref 8.5–10.1)
CHLORIDE SERPL-SCNC: 104 MMOL/L (ref 98–107)
CHLORIDE SERPL-SCNC: 106 MMOL/L (ref 98–107)
CHLORIDE SERPL-SCNC: 109 MMOL/L (ref 98–107)
CHLORIDE SERPL-SCNC: 110 MMOL/L (ref 98–107)
CHLORIDE SERPL-SCNC: 112 MMOL/L (ref 98–107)
CHLORIDE SERPL-SCNC: 114 MMOL/L (ref 98–107)
CHLORIDE SERPL-SCNC: 116 MMOL/L (ref 98–107)
CHLORIDE SERPL-SCNC: 119 MMOL/L (ref 98–107)
CHLORIDE SERPL-SCNC: 122 MMOL/L (ref 98–107)
CHLORIDE SERPL-SCNC: 126 MMOL/L (ref 98–107)
CHLORIDE SERPL-SCNC: 126 MMOL/L (ref 98–107)
CHLORIDE SERPL-SCNC: 127 MMOL/L (ref 98–107)
CHLORIDE SERPL-SCNC: 128 MMOL/L (ref 98–107)
CHLORIDE SERPL-SCNC: 131 MMOL/L (ref 98–107)
CHLORIDE SERPL-SCNC: 132 MMOL/L (ref 98–107)
CHLORIDE SERPL-SCNC: 133 MMOL/L (ref 98–107)
CHLORIDE SERPL-SCNC: 136 MMOL/L (ref 98–107)
CHLORIDE SERPL-SCNC: 98 MMOL/L (ref 98–107)
CHLORIDE SERPL-SCNC: NORMAL MMOL/L (ref 94–109)
CHLORIDE SERPL-SCNC: NORMAL MMOL/L (ref 94–109)
CO2 SERPL-SCNC: 17 MMOL/L (ref 21–31)
CO2 SERPL-SCNC: 18 MMOL/L (ref 21–31)
CO2 SERPL-SCNC: 19 MMOL/L (ref 21–31)
CO2 SERPL-SCNC: 20 MMOL/L (ref 21–31)
CO2 SERPL-SCNC: 21 MMOL/L (ref 21–31)
CO2 SERPL-SCNC: 22 MMOL/L (ref 21–31)
CO2 SERPL-SCNC: 23 MMOL/L (ref 21–31)
CO2 SERPL-SCNC: 24 MMOL/L (ref 21–31)
CO2 SERPL-SCNC: 24 MMOL/L (ref 21–31)
CO2 SERPL-SCNC: 25 MMOL/L (ref 21–31)
CO2 SERPL-SCNC: 27 MMOL/L (ref 21–31)
CO2 SERPL-SCNC: NORMAL MMOL/L (ref 20–32)
CO2 SERPL-SCNC: NORMAL MMOL/L (ref 20–32)
COLOR UR AUTO: YELLOW
CREAT SERPL-MCNC: 0.73 MG/DL (ref 0.6–1.2)
CREAT SERPL-MCNC: 0.87 MG/DL (ref 0.6–1.2)
CREAT SERPL-MCNC: 0.9 MG/DL (ref 0.6–1.2)
CREAT SERPL-MCNC: 0.94 MG/DL (ref 0.6–1.2)
CREAT SERPL-MCNC: 1 MG/DL (ref 0.6–1.2)
CREAT SERPL-MCNC: 1.01 MG/DL (ref 0.6–1.2)
CREAT SERPL-MCNC: 1.02 MG/DL (ref 0.6–1.2)
CREAT SERPL-MCNC: 1.02 MG/DL (ref 0.6–1.2)
CREAT SERPL-MCNC: 1.05 MG/DL (ref 0.6–1.2)
CREAT SERPL-MCNC: 1.06 MG/DL (ref 0.6–1.2)
CREAT SERPL-MCNC: 1.09 MG/DL (ref 0.6–1.2)
CREAT SERPL-MCNC: 1.11 MG/DL (ref 0.6–1.2)
CREAT SERPL-MCNC: 1.2 MG/DL (ref 0.6–1.2)
CREAT SERPL-MCNC: 1.24 MG/DL (ref 0.6–1.2)
CREAT SERPL-MCNC: 1.36 MG/DL (ref 0.6–1.2)
CREAT SERPL-MCNC: 1.42 MG/DL (ref 0.6–1.2)
CREAT SERPL-MCNC: 1.54 MG/DL (ref 0.6–1.2)
CREAT SERPL-MCNC: 1.89 MG/DL (ref 0.6–1.2)
CREAT SERPL-MCNC: 2.34 MG/DL (ref 0.6–1.2)
CREAT SERPL-MCNC: 2.47 MG/DL (ref 0.6–1.2)
CREAT SERPL-MCNC: NORMAL MG/DL (ref 0.52–1.04)
CREAT SERPL-MCNC: NORMAL MG/DL (ref 0.52–1.04)
DIFFERENTIAL METHOD BLD: ABNORMAL
DIFFERENTIAL METHOD BLD: NORMAL
EOSINOPHIL # BLD AUTO: 0 10E9/L (ref 0–0.7)
EOSINOPHIL # BLD AUTO: 0 10E9/L (ref 0–0.7)
EOSINOPHIL NFR BLD AUTO: 0 %
EOSINOPHIL NFR BLD AUTO: 0.4 %
ERYTHROCYTE [DISTWIDTH] IN BLOOD BY AUTOMATED COUNT: 13.9 % (ref 10–15)
ERYTHROCYTE [DISTWIDTH] IN BLOOD BY AUTOMATED COUNT: 13.9 % (ref 10–15)
ERYTHROCYTE [DISTWIDTH] IN BLOOD BY AUTOMATED COUNT: 14.1 % (ref 10–15)
ERYTHROCYTE [DISTWIDTH] IN BLOOD BY AUTOMATED COUNT: 14.3 % (ref 10–15)
ERYTHROCYTE [DISTWIDTH] IN BLOOD BY AUTOMATED COUNT: 14.7 % (ref 10–15)
ERYTHROCYTE [DISTWIDTH] IN BLOOD BY AUTOMATED COUNT: 14.9 % (ref 10–15)
ERYTHROCYTE [DISTWIDTH] IN BLOOD BY AUTOMATED COUNT: 15.1 % (ref 10–15)
GFR SERPL CREATININE-BSD FRML MDRD: 19 ML/MIN/{1.73_M2}
GFR SERPL CREATININE-BSD FRML MDRD: 20 ML/MIN/{1.73_M2}
GFR SERPL CREATININE-BSD FRML MDRD: 25 ML/MIN/{1.73_M2}
GFR SERPL CREATININE-BSD FRML MDRD: 32 ML/MIN/{1.73_M2}
GFR SERPL CREATININE-BSD FRML MDRD: 35 ML/MIN/{1.73_M2}
GFR SERPL CREATININE-BSD FRML MDRD: 37 ML/MIN/{1.73_M2}
GFR SERPL CREATININE-BSD FRML MDRD: 41 ML/MIN/{1.73_M2}
GFR SERPL CREATININE-BSD FRML MDRD: 43 ML/MIN/{1.73_M2}
GFR SERPL CREATININE-BSD FRML MDRD: 47 ML/MIN/{1.73_M2}
GFR SERPL CREATININE-BSD FRML MDRD: 48 ML/MIN/{1.73_M2}
GFR SERPL CREATININE-BSD FRML MDRD: 49 ML/MIN/{1.73_M2}
GFR SERPL CREATININE-BSD FRML MDRD: 50 ML/MIN/{1.73_M2}
GFR SERPL CREATININE-BSD FRML MDRD: 51 ML/MIN/{1.73_M2}
GFR SERPL CREATININE-BSD FRML MDRD: 51 ML/MIN/{1.73_M2}
GFR SERPL CREATININE-BSD FRML MDRD: 52 ML/MIN/{1.73_M2}
GFR SERPL CREATININE-BSD FRML MDRD: 53 ML/MIN/{1.73_M2}
GFR SERPL CREATININE-BSD FRML MDRD: 56 ML/MIN/{1.73_M2}
GFR SERPL CREATININE-BSD FRML MDRD: 59 ML/MIN/{1.73_M2}
GFR SERPL CREATININE-BSD FRML MDRD: 62 ML/MIN/{1.73_M2}
GFR SERPL CREATININE-BSD FRML MDRD: 76 ML/MIN/{1.73_M2}
GFR SERPL CREATININE-BSD FRML MDRD: NORMAL ML/MIN/{1.73_M2}
GFR SERPL CREATININE-BSD FRML MDRD: NORMAL ML/MIN/{1.73_M2}
GLUCOSE SERPL-MCNC: 102 MG/DL (ref 70–105)
GLUCOSE SERPL-MCNC: 103 MG/DL (ref 70–105)
GLUCOSE SERPL-MCNC: 104 MG/DL (ref 70–105)
GLUCOSE SERPL-MCNC: 111 MG/DL (ref 70–105)
GLUCOSE SERPL-MCNC: 120 MG/DL (ref 70–105)
GLUCOSE SERPL-MCNC: 126 MG/DL (ref 70–105)
GLUCOSE SERPL-MCNC: 128 MG/DL (ref 70–105)
GLUCOSE SERPL-MCNC: 135 MG/DL (ref 70–105)
GLUCOSE SERPL-MCNC: 164 MG/DL (ref 70–105)
GLUCOSE SERPL-MCNC: 165 MG/DL (ref 70–105)
GLUCOSE SERPL-MCNC: 175 MG/DL (ref 70–105)
GLUCOSE SERPL-MCNC: 201 MG/DL (ref 70–105)
GLUCOSE SERPL-MCNC: 261 MG/DL (ref 70–105)
GLUCOSE SERPL-MCNC: 81 MG/DL (ref 70–105)
GLUCOSE SERPL-MCNC: 85 MG/DL (ref 70–105)
GLUCOSE SERPL-MCNC: 86 MG/DL (ref 70–105)
GLUCOSE SERPL-MCNC: 90 MG/DL (ref 70–105)
GLUCOSE SERPL-MCNC: 93 MG/DL (ref 70–105)
GLUCOSE SERPL-MCNC: NORMAL MG/DL (ref 70–99)
GLUCOSE SERPL-MCNC: NORMAL MG/DL (ref 70–99)
GLUCOSE UR STRIP-MCNC: NEGATIVE MG/DL
HCT VFR BLD AUTO: 36.2 % (ref 35–47)
HCT VFR BLD AUTO: 36.3 % (ref 35–47)
HCT VFR BLD AUTO: 40.9 % (ref 35–47)
HCT VFR BLD AUTO: 41.8 % (ref 35–47)
HCT VFR BLD AUTO: 42 % (ref 35–47)
HCT VFR BLD AUTO: 46.2 % (ref 35–47)
HCT VFR BLD AUTO: 56 % (ref 35–47)
HGB BLD-MCNC: 11.5 G/DL (ref 11.7–15.7)
HGB BLD-MCNC: 11.5 G/DL (ref 11.7–15.7)
HGB BLD-MCNC: 12 G/DL (ref 11.7–15.7)
HGB BLD-MCNC: 12.7 G/DL (ref 11.7–15.7)
HGB BLD-MCNC: 13.5 G/DL (ref 11.7–15.7)
HGB BLD-MCNC: 13.7 G/DL (ref 11.7–15.7)
HGB BLD-MCNC: 17.2 G/DL (ref 11.7–15.7)
HGB UR QL STRIP: ABNORMAL
IMM GRANULOCYTES # BLD: 0.1 10E9/L (ref 0–0.4)
IMM GRANULOCYTES # BLD: 0.1 10E9/L (ref 0–0.4)
IMM GRANULOCYTES NFR BLD: 0.6 %
IMM GRANULOCYTES NFR BLD: 0.7 %
KETONES UR STRIP-MCNC: ABNORMAL MG/DL
LEUKOCYTE ESTERASE UR QL STRIP: ABNORMAL
LYMPHOCYTES # BLD AUTO: 1.8 10E9/L (ref 0.8–5.3)
LYMPHOCYTES # BLD AUTO: 2.2 10E9/L (ref 0.8–5.3)
LYMPHOCYTES NFR BLD AUTO: 12.4 %
LYMPHOCYTES NFR BLD AUTO: 22.1 %
MCH RBC QN AUTO: 29 PG (ref 26.5–33)
MCH RBC QN AUTO: 29 PG (ref 26.5–33)
MCH RBC QN AUTO: 29.1 PG (ref 26.5–33)
MCH RBC QN AUTO: 29.3 PG (ref 26.5–33)
MCH RBC QN AUTO: 29.3 PG (ref 26.5–33)
MCH RBC QN AUTO: 29.5 PG (ref 26.5–33)
MCH RBC QN AUTO: 29.5 PG (ref 26.5–33)
MCHC RBC AUTO-ENTMCNC: 29.3 G/DL (ref 31.5–36.5)
MCHC RBC AUTO-ENTMCNC: 29.7 G/DL (ref 31.5–36.5)
MCHC RBC AUTO-ENTMCNC: 30.4 G/DL (ref 31.5–36.5)
MCHC RBC AUTO-ENTMCNC: 30.7 G/DL (ref 31.5–36.5)
MCHC RBC AUTO-ENTMCNC: 31.7 G/DL (ref 31.5–36.5)
MCHC RBC AUTO-ENTMCNC: 31.8 G/DL (ref 31.5–36.5)
MCHC RBC AUTO-ENTMCNC: 32.1 G/DL (ref 31.5–36.5)
MCV RBC AUTO: 101 FL (ref 78–100)
MCV RBC AUTO: 90 FL (ref 78–100)
MCV RBC AUTO: 91 FL (ref 78–100)
MCV RBC AUTO: 92 FL (ref 78–100)
MCV RBC AUTO: 95 FL (ref 78–100)
MCV RBC AUTO: 96 FL (ref 78–100)
MCV RBC AUTO: 99 FL (ref 78–100)
MONOCYTES # BLD AUTO: 0.8 10E9/L (ref 0–1.3)
MONOCYTES # BLD AUTO: 0.9 10E9/L (ref 0–1.3)
MONOCYTES NFR BLD AUTO: 5.3 %
MONOCYTES NFR BLD AUTO: 8.5 %
NEUTROPHILS # BLD AUTO: 12.1 10E9/L (ref 1.6–8.3)
NEUTROPHILS # BLD AUTO: 6.8 10E9/L (ref 1.6–8.3)
NEUTROPHILS NFR BLD AUTO: 68 %
NEUTROPHILS NFR BLD AUTO: 81.4 %
NITRATE UR QL: POSITIVE
NON-SQ EPI CELLS #/AREA URNS LPF: ABNORMAL /LPF
PH UR STRIP: 5 PH (ref 5–9)
PLATELET # BLD AUTO: 111 10E9/L (ref 150–450)
PLATELET # BLD AUTO: 145 10E9/L (ref 150–450)
PLATELET # BLD AUTO: 186 10E9/L (ref 150–450)
PLATELET # BLD AUTO: 231 10E9/L (ref 150–450)
PLATELET # BLD AUTO: 257 10E9/L (ref 150–450)
PLATELET # BLD AUTO: 371 10E9/L (ref 150–450)
PLATELET # BLD AUTO: 87 10E9/L (ref 150–450)
POTASSIUM SERPL-SCNC: 3.1 MMOL/L (ref 3.5–5.1)
POTASSIUM SERPL-SCNC: 3.2 MMOL/L (ref 3.5–5.1)
POTASSIUM SERPL-SCNC: 3.3 MMOL/L (ref 3.5–5.1)
POTASSIUM SERPL-SCNC: 3.4 MMOL/L (ref 3.5–5.1)
POTASSIUM SERPL-SCNC: 3.5 MMOL/L (ref 3.5–5.1)
POTASSIUM SERPL-SCNC: 3.6 MMOL/L (ref 3.5–5.1)
POTASSIUM SERPL-SCNC: 3.8 MMOL/L (ref 3.5–5.1)
POTASSIUM SERPL-SCNC: 3.9 MMOL/L (ref 3.5–5.1)
POTASSIUM SERPL-SCNC: 4 MMOL/L (ref 3.5–5.1)
POTASSIUM SERPL-SCNC: 4 MMOL/L (ref 3.5–5.1)
POTASSIUM SERPL-SCNC: 4.2 MMOL/L (ref 3.5–5.1)
POTASSIUM SERPL-SCNC: 4.2 MMOL/L (ref 3.5–5.1)
POTASSIUM SERPL-SCNC: 4.3 MMOL/L (ref 3.5–5.1)
POTASSIUM SERPL-SCNC: 4.3 MMOL/L (ref 3.5–5.1)
POTASSIUM SERPL-SCNC: 4.4 MMOL/L (ref 3.5–5.1)
POTASSIUM SERPL-SCNC: 5.1 MMOL/L (ref 3.5–5.1)
POTASSIUM SERPL-SCNC: 5.4 MMOL/L (ref 3.5–5.1)
POTASSIUM SERPL-SCNC: NORMAL MMOL/L (ref 3.4–5.3)
POTASSIUM SERPL-SCNC: NORMAL MMOL/L (ref 3.4–5.3)
RBC # BLD AUTO: 3.93 10E12/L (ref 3.8–5.2)
RBC # BLD AUTO: 3.97 10E12/L (ref 3.8–5.2)
RBC # BLD AUTO: 4.07 10E12/L (ref 3.8–5.2)
RBC # BLD AUTO: 4.34 10E12/L (ref 3.8–5.2)
RBC # BLD AUTO: 4.65 10E12/L (ref 3.8–5.2)
RBC # BLD AUTO: 4.65 10E12/L (ref 3.8–5.2)
RBC # BLD AUTO: 5.92 10E12/L (ref 3.8–5.2)
RBC #/AREA URNS AUTO: ABNORMAL /HPF
SODIUM SERPL-SCNC: 133 MMOL/L (ref 134–144)
SODIUM SERPL-SCNC: 138 MMOL/L (ref 134–144)
SODIUM SERPL-SCNC: 138 MMOL/L (ref 134–144)
SODIUM SERPL-SCNC: 141 MMOL/L (ref 134–144)
SODIUM SERPL-SCNC: 142 MMOL/L (ref 134–144)
SODIUM SERPL-SCNC: 142 MMOL/L (ref 134–144)
SODIUM SERPL-SCNC: 144 MMOL/L (ref 134–144)
SODIUM SERPL-SCNC: 149 MMOL/L (ref 134–144)
SODIUM SERPL-SCNC: 149 MMOL/L (ref 134–144)
SODIUM SERPL-SCNC: 150 MMOL/L (ref 134–144)
SODIUM SERPL-SCNC: 152 MMOL/L (ref 134–144)
SODIUM SERPL-SCNC: 155 MMOL/L (ref 134–144)
SODIUM SERPL-SCNC: 161 MMOL/L (ref 134–144)
SODIUM SERPL-SCNC: 163 MMOL/L (ref 134–144)
SODIUM SERPL-SCNC: 166 MMOL/L (ref 134–144)
SODIUM SERPL-SCNC: 166 MMOL/L (ref 134–144)
SODIUM SERPL-SCNC: 167 MMOL/L (ref 134–144)
SODIUM SERPL-SCNC: 168 MMOL/L (ref 134–144)
SODIUM SERPL-SCNC: 169 MMOL/L (ref 134–144)
SODIUM SERPL-SCNC: NORMAL MMOL/L (ref 133–144)
SODIUM SERPL-SCNC: NORMAL MMOL/L (ref 133–144)
SOURCE: ABNORMAL
SP GR UR STRIP: >1.03 (ref 1–1.03)
SPECIMEN SOURCE: ABNORMAL
SPECIMEN SOURCE: NORMAL
TSH SERPL DL<=0.05 MIU/L-ACNC: 2.71 IU/ML (ref 0.34–5.6)
UROBILINOGEN UR STRIP-ACNC: 0.2 EU/DL (ref 0.2–1)
WBC # BLD AUTO: 10.1 10E9/L (ref 4–11)
WBC # BLD AUTO: 11.2 10E9/L (ref 4–11)
WBC # BLD AUTO: 11.9 10E9/L (ref 4–11)
WBC # BLD AUTO: 14.8 10E9/L (ref 4–11)
WBC # BLD AUTO: 7.7 10E9/L (ref 4–11)
WBC # BLD AUTO: 8.6 10E9/L (ref 4–11)
WBC # BLD AUTO: 9.2 10E9/L (ref 4–11)
WBC #/AREA URNS AUTO: ABNORMAL /HPF

## 2019-01-01 PROCEDURE — 70110 X-RAY EXAM OF JAW 4/> VIEWS: CPT

## 2019-01-01 PROCEDURE — 80048 BASIC METABOLIC PNL TOTAL CA: CPT | Mod: ZL | Performed by: FAMILY MEDICINE

## 2019-01-01 PROCEDURE — 99232 SBSQ HOSP IP/OBS MODERATE 35: CPT | Performed by: INTERNAL MEDICINE

## 2019-01-01 PROCEDURE — 36415 COLL VENOUS BLD VENIPUNCTURE: CPT | Performed by: INTERNAL MEDICINE

## 2019-01-01 PROCEDURE — 99308 SBSQ NF CARE LOW MDM 20: CPT | Performed by: FAMILY MEDICINE

## 2019-01-01 PROCEDURE — 12000000 ZZH R&B MED SURG/OB

## 2019-01-01 PROCEDURE — 25000128 H RX IP 250 OP 636: Performed by: INTERNAL MEDICINE

## 2019-01-01 PROCEDURE — 80048 BASIC METABOLIC PNL TOTAL CA: CPT | Performed by: INTERNAL MEDICINE

## 2019-01-01 PROCEDURE — G0463 HOSPITAL OUTPT CLINIC VISIT: HCPCS

## 2019-01-01 PROCEDURE — 85027 COMPLETE CBC AUTOMATED: CPT | Performed by: FAMILY MEDICINE

## 2019-01-01 PROCEDURE — 85027 COMPLETE CBC AUTOMATED: CPT | Performed by: INTERNAL MEDICINE

## 2019-01-01 PROCEDURE — 25000128 H RX IP 250 OP 636: Performed by: FAMILY MEDICINE

## 2019-01-01 PROCEDURE — 96376 TX/PRO/DX INJ SAME DRUG ADON: CPT | Performed by: FAMILY MEDICINE

## 2019-01-01 PROCEDURE — 81001 URINALYSIS AUTO W/SCOPE: CPT | Performed by: INTERNAL MEDICINE

## 2019-01-01 PROCEDURE — 36415 COLL VENOUS BLD VENIPUNCTURE: CPT | Performed by: FAMILY MEDICINE

## 2019-01-01 PROCEDURE — 99308 SBSQ NF CARE LOW MDM 20: CPT | Performed by: NURSE PRACTITIONER

## 2019-01-01 PROCEDURE — 25800030 ZZH RX IP 258 OP 636: Performed by: INTERNAL MEDICINE

## 2019-01-01 PROCEDURE — 85025 COMPLETE CBC W/AUTO DIFF WBC: CPT | Performed by: FAMILY MEDICINE

## 2019-01-01 PROCEDURE — 92610 EVALUATE SWALLOWING FUNCTION: CPT | Mod: GN

## 2019-01-01 PROCEDURE — 92526 ORAL FUNCTION THERAPY: CPT | Mod: GN

## 2019-01-01 PROCEDURE — 25000132 ZZH RX MED GY IP 250 OP 250 PS 637: Mod: GY | Performed by: INTERNAL MEDICINE

## 2019-01-01 PROCEDURE — 25800030 ZZH RX IP 258 OP 636: Performed by: FAMILY MEDICINE

## 2019-01-01 PROCEDURE — 36410 VNPNXR 3YR/> PHY/QHP DX/THER: CPT | Performed by: NURSE ANESTHETIST, CERTIFIED REGISTERED

## 2019-01-01 PROCEDURE — 99285 EMERGENCY DEPT VISIT HI MDM: CPT | Mod: 25 | Performed by: FAMILY MEDICINE

## 2019-01-01 PROCEDURE — 87086 URINE CULTURE/COLONY COUNT: CPT | Performed by: INTERNAL MEDICINE

## 2019-01-01 PROCEDURE — 99223 1ST HOSP IP/OBS HIGH 75: CPT | Mod: AI | Performed by: INTERNAL MEDICINE

## 2019-01-01 PROCEDURE — 36600 WITHDRAWAL OF ARTERIAL BLOOD: CPT | Performed by: FAMILY MEDICINE

## 2019-01-01 PROCEDURE — 99213 OFFICE O/P EST LOW 20 MIN: CPT | Performed by: FAMILY MEDICINE

## 2019-01-01 PROCEDURE — 99239 HOSP IP/OBS DSCHRG MGMT >30: CPT | Performed by: INTERNAL MEDICINE

## 2019-01-01 PROCEDURE — 99231 SBSQ HOSP IP/OBS SF/LOW 25: CPT | Performed by: INTERNAL MEDICINE

## 2019-01-01 PROCEDURE — 99233 SBSQ HOSP IP/OBS HIGH 50: CPT | Performed by: INTERNAL MEDICINE

## 2019-01-01 PROCEDURE — 80048 BASIC METABOLIC PNL TOTAL CA: CPT | Performed by: FAMILY MEDICINE

## 2019-01-01 PROCEDURE — 84295 ASSAY OF SERUM SODIUM: CPT | Performed by: INTERNAL MEDICINE

## 2019-01-01 PROCEDURE — 40000225 ZZH STATISTIC SLP WARD VISIT

## 2019-01-01 PROCEDURE — 99285 EMERGENCY DEPT VISIT HI MDM: CPT | Mod: Z6 | Performed by: FAMILY MEDICINE

## 2019-01-01 PROCEDURE — 84132 ASSAY OF SERUM POTASSIUM: CPT | Performed by: INTERNAL MEDICINE

## 2019-01-01 PROCEDURE — 99309 SBSQ NF CARE MODERATE MDM 30: CPT | Performed by: NURSE PRACTITIONER

## 2019-01-01 PROCEDURE — 99310 SBSQ NF CARE HIGH MDM 45: CPT | Performed by: NURSE PRACTITIONER

## 2019-01-01 PROCEDURE — 0RSDXZZ REPOSITION LEFT TEMPOROMANDIBULAR JOINT, EXTERNAL APPROACH: ICD-10-PCS | Performed by: FAMILY MEDICINE

## 2019-01-01 PROCEDURE — 36416 COLLJ CAPILLARY BLOOD SPEC: CPT | Performed by: FAMILY MEDICINE

## 2019-01-01 PROCEDURE — 0RSCXZZ REPOSITION RIGHT TEMPOROMANDIBULAR JOINT, EXTERNAL APPROACH: ICD-10-PCS | Performed by: FAMILY MEDICINE

## 2019-01-01 PROCEDURE — 87088 URINE BACTERIA CULTURE: CPT | Performed by: INTERNAL MEDICINE

## 2019-01-01 PROCEDURE — 87493 C DIFF AMPLIFIED PROBE: CPT | Performed by: FAMILY MEDICINE

## 2019-01-01 PROCEDURE — 99495 TRANSJ CARE MGMT MOD F2F 14D: CPT | Performed by: FAMILY MEDICINE

## 2019-01-01 PROCEDURE — 96375 TX/PRO/DX INJ NEW DRUG ADDON: CPT | Performed by: FAMILY MEDICINE

## 2019-01-01 PROCEDURE — G0463 HOSPITAL OUTPT CLINIC VISIT: HCPCS | Performed by: FAMILY MEDICINE

## 2019-01-01 PROCEDURE — 96374 THER/PROPH/DIAG INJ IV PUSH: CPT | Performed by: FAMILY MEDICINE

## 2019-01-01 PROCEDURE — 36415 COLL VENOUS BLD VENIPUNCTURE: CPT | Mod: ZL | Performed by: FAMILY MEDICINE

## 2019-01-01 PROCEDURE — 96361 HYDRATE IV INFUSION ADD-ON: CPT | Performed by: FAMILY MEDICINE

## 2019-01-01 RX ORDER — BISACODYL 10 MG
10 SUPPOSITORY, RECTAL RECTAL DAILY PRN
Status: DISCONTINUED | OUTPATIENT
Start: 2019-01-01 | End: 2019-01-01 | Stop reason: HOSPADM

## 2019-01-01 RX ORDER — LIDOCAINE 40 MG/G
CREAM TOPICAL
Status: DISCONTINUED | OUTPATIENT
Start: 2019-01-01 | End: 2019-01-01 | Stop reason: HOSPADM

## 2019-01-01 RX ORDER — CEFTRIAXONE SODIUM 1 G/50ML
1 INJECTION, SOLUTION INTRAVENOUS EVERY 24 HOURS
Status: DISCONTINUED | OUTPATIENT
Start: 2019-01-01 | End: 2019-01-01 | Stop reason: HOSPADM

## 2019-01-01 RX ORDER — PROCHLORPERAZINE 25 MG
12.5 SUPPOSITORY, RECTAL RECTAL EVERY 12 HOURS PRN
Status: DISCONTINUED | OUTPATIENT
Start: 2019-01-01 | End: 2019-01-01 | Stop reason: HOSPADM

## 2019-01-01 RX ORDER — BISACODYL 10 MG
10 SUPPOSITORY, RECTAL RECTAL
Status: ON HOLD | COMMUNITY
End: 2019-01-01

## 2019-01-01 RX ORDER — ONDANSETRON 2 MG/ML
4 INJECTION INTRAMUSCULAR; INTRAVENOUS EVERY 6 HOURS PRN
Status: DISCONTINUED | OUTPATIENT
Start: 2019-01-01 | End: 2019-01-01 | Stop reason: HOSPADM

## 2019-01-01 RX ORDER — IPRATROPIUM BROMIDE AND ALBUTEROL SULFATE 2.5; .5 MG/3ML; MG/3ML
3 SOLUTION RESPIRATORY (INHALATION)
Status: DISCONTINUED | OUTPATIENT
Start: 2019-01-01 | End: 2019-01-01 | Stop reason: HOSPADM

## 2019-01-01 RX ORDER — SODIUM CHLORIDE AND POTASSIUM CHLORIDE 150; 450 MG/100ML; MG/100ML
INJECTION, SOLUTION INTRAVENOUS CONTINUOUS
Status: DISCONTINUED | OUTPATIENT
Start: 2019-01-01 | End: 2019-01-01

## 2019-01-01 RX ORDER — POTASSIUM CHLORIDE 1.5 G/1.58G
20-40 POWDER, FOR SOLUTION ORAL
Status: DISCONTINUED | OUTPATIENT
Start: 2019-01-01 | End: 2019-01-01 | Stop reason: HOSPADM

## 2019-01-01 RX ORDER — SODIUM CHLORIDE 9 MG/ML
INJECTION, SOLUTION INTRAVENOUS CONTINUOUS
Status: DISCONTINUED | OUTPATIENT
Start: 2019-01-01 | End: 2019-01-01

## 2019-01-01 RX ORDER — ACETAMINOPHEN 650 MG/1
650 SUPPOSITORY RECTAL EVERY 4 HOURS PRN
Status: DISCONTINUED | OUTPATIENT
Start: 2019-01-01 | End: 2019-01-01 | Stop reason: HOSPADM

## 2019-01-01 RX ORDER — ONDANSETRON 4 MG/1
4 TABLET, ORALLY DISINTEGRATING ORAL EVERY 6 HOURS PRN
Status: DISCONTINUED | OUTPATIENT
Start: 2019-01-01 | End: 2019-01-01 | Stop reason: HOSPADM

## 2019-01-01 RX ORDER — LEVOTHYROXINE SODIUM 50 UG/1
50 TABLET ORAL DAILY
DISCHARGE
Start: 2019-01-01

## 2019-01-01 RX ORDER — NALOXONE HYDROCHLORIDE 0.4 MG/ML
.1-.4 INJECTION, SOLUTION INTRAMUSCULAR; INTRAVENOUS; SUBCUTANEOUS
Status: DISCONTINUED | OUTPATIENT
Start: 2019-01-01 | End: 2019-01-01 | Stop reason: HOSPADM

## 2019-01-01 RX ORDER — ZINC OXIDE 216 MG/ML
1 LOTION TOPICAL 3 TIMES DAILY
DISCHARGE
Start: 2019-01-01

## 2019-01-01 RX ORDER — BACLOFEN 10 MG/1
5 TABLET ORAL 2 TIMES DAILY PRN
Status: ON HOLD | COMMUNITY
Start: 2019-01-01 | End: 2019-01-01

## 2019-01-01 RX ORDER — DEXTROSE MONOHYDRATE 50 MG/ML
INJECTION, SOLUTION INTRAVENOUS CONTINUOUS
Status: DISCONTINUED | OUTPATIENT
Start: 2019-01-01 | End: 2019-01-01 | Stop reason: CLARIF

## 2019-01-01 RX ORDER — LEVOTHYROXINE SODIUM 50 UG/1
50 TABLET ORAL DAILY
Status: DISCONTINUED | OUTPATIENT
Start: 2019-01-01 | End: 2019-01-01 | Stop reason: HOSPADM

## 2019-01-01 RX ORDER — BISACODYL 10 MG
10 SUPPOSITORY, RECTAL RECTAL
DISCHARGE
Start: 2019-01-01

## 2019-01-01 RX ORDER — DEXTROSE MONOHYDRATE 50 MG/ML
INJECTION, SOLUTION INTRAVENOUS CONTINUOUS
Status: DISCONTINUED | OUTPATIENT
Start: 2019-01-01 | End: 2019-01-01

## 2019-01-01 RX ORDER — POTASSIUM CHLORIDE 1500 MG/1
20-40 TABLET, EXTENDED RELEASE ORAL
Status: DISCONTINUED | OUTPATIENT
Start: 2019-01-01 | End: 2019-01-01 | Stop reason: CLARIF

## 2019-01-01 RX ORDER — DEXTROSE MONOHYDRATE, SODIUM CHLORIDE, AND POTASSIUM CHLORIDE 50; 1.49; 4.5 G/1000ML; G/1000ML; G/1000ML
INJECTION, SOLUTION INTRAVENOUS ONCE
Status: COMPLETED | OUTPATIENT
Start: 2019-01-01 | End: 2019-01-01

## 2019-01-01 RX ORDER — VALACYCLOVIR HYDROCHLORIDE 500 MG/1
500 TABLET, FILM COATED ORAL DAILY
COMMUNITY
Start: 2019-01-01

## 2019-01-01 RX ORDER — FENTANYL CITRATE 50 UG/ML
50 INJECTION, SOLUTION INTRAMUSCULAR; INTRAVENOUS ONCE
Status: COMPLETED | OUTPATIENT
Start: 2019-01-01 | End: 2019-01-01

## 2019-01-01 RX ORDER — SODIUM CHLORIDE 9 MG/ML
1000 INJECTION, SOLUTION INTRAVENOUS CONTINUOUS
Status: DISCONTINUED | OUTPATIENT
Start: 2019-01-01 | End: 2019-01-01 | Stop reason: DRUGHIGH

## 2019-01-01 RX ORDER — SULFAMETHOXAZOLE/TRIMETHOPRIM 800-160 MG
1 TABLET ORAL 2 TIMES DAILY
DISCHARGE
Start: 2019-01-01 | End: 2019-01-01

## 2019-01-01 RX ORDER — POTASSIUM CHLORIDE 7.45 MG/ML
10 INJECTION INTRAVENOUS
Status: DISCONTINUED | OUTPATIENT
Start: 2019-01-01 | End: 2019-01-01 | Stop reason: HOSPADM

## 2019-01-01 RX ORDER — PROCHLORPERAZINE MALEATE 5 MG
5 TABLET ORAL EVERY 6 HOURS PRN
Status: DISCONTINUED | OUTPATIENT
Start: 2019-01-01 | End: 2019-01-01 | Stop reason: HOSPADM

## 2019-01-01 RX ADMIN — POTASSIUM CHLORIDE 10 MEQ: 7.46 INJECTION, SOLUTION INTRAVENOUS at 08:03

## 2019-01-01 RX ADMIN — MIDAZOLAM 1 MG: 1 INJECTION INTRAMUSCULAR; INTRAVENOUS at 13:23

## 2019-01-01 RX ADMIN — CEFTRIAXONE SODIUM 1 G: 1 INJECTION, SOLUTION INTRAVENOUS at 07:26

## 2019-01-01 RX ADMIN — POTASSIUM CHLORIDE 10 MEQ: 7.46 INJECTION, SOLUTION INTRAVENOUS at 08:47

## 2019-01-01 RX ADMIN — POTASSIUM CHLORIDE AND SODIUM CHLORIDE: 450; 150 INJECTION, SOLUTION INTRAVENOUS at 17:14

## 2019-01-01 RX ADMIN — POTASSIUM CHLORIDE 10 MEQ: 7.46 INJECTION, SOLUTION INTRAVENOUS at 05:58

## 2019-01-01 RX ADMIN — CEFTRIAXONE SODIUM 1 G: 1 INJECTION, SOLUTION INTRAVENOUS at 07:45

## 2019-01-01 RX ADMIN — SODIUM CHLORIDE: 9 INJECTION, SOLUTION INTRAVENOUS at 07:26

## 2019-01-01 RX ADMIN — LEVOTHYROXINE SODIUM 50 MCG: 50 TABLET ORAL at 09:42

## 2019-01-01 RX ADMIN — POTASSIUM CHLORIDE AND SODIUM CHLORIDE: 450; 150 INJECTION, SOLUTION INTRAVENOUS at 09:32

## 2019-01-01 RX ADMIN — POTASSIUM CHLORIDE 10 MEQ: 7.46 INJECTION, SOLUTION INTRAVENOUS at 07:16

## 2019-01-01 RX ADMIN — Medication 7.5 MG: at 22:39

## 2019-01-01 RX ADMIN — POTASSIUM CHLORIDE 10 MEQ: 7.46 INJECTION, SOLUTION INTRAVENOUS at 09:48

## 2019-01-01 RX ADMIN — SODIUM CHLORIDE: 9 INJECTION, SOLUTION INTRAVENOUS at 04:17

## 2019-01-01 RX ADMIN — POTASSIUM CHLORIDE 10 MEQ: 7.46 INJECTION, SOLUTION INTRAVENOUS at 06:47

## 2019-01-01 RX ADMIN — ONDANSETRON HYDROCHLORIDE 4 MG: 2 INJECTION, SOLUTION INTRAMUSCULAR; INTRAVENOUS at 00:45

## 2019-01-01 RX ADMIN — SODIUM CHLORIDE 1000 ML: 9 INJECTION, SOLUTION INTRAVENOUS at 10:20

## 2019-01-01 RX ADMIN — DEXTROSE MONOHYDRATE: 5 INJECTION, SOLUTION INTRAVENOUS at 12:49

## 2019-01-01 RX ADMIN — FENTANYL CITRATE 50 MCG: 50 INJECTION, SOLUTION INTRAMUSCULAR; INTRAVENOUS at 13:23

## 2019-01-01 RX ADMIN — LEVOTHYROXINE SODIUM 50 MCG: 50 TABLET ORAL at 09:01

## 2019-01-01 RX ADMIN — FENTANYL CITRATE 50 MCG: 50 INJECTION, SOLUTION INTRAMUSCULAR; INTRAVENOUS at 12:52

## 2019-01-01 RX ADMIN — SODIUM CHLORIDE: 9 INJECTION, SOLUTION INTRAVENOUS at 18:00

## 2019-01-01 RX ADMIN — POTASSIUM CHLORIDE 10 MEQ: 7.46 INJECTION, SOLUTION INTRAVENOUS at 05:40

## 2019-01-01 RX ADMIN — DEXTROSE MONOHYDRATE: 5 INJECTION, SOLUTION INTRAVENOUS at 12:35

## 2019-01-01 RX ADMIN — SODIUM CHLORIDE 1000 ML: 9 INJECTION, SOLUTION INTRAVENOUS at 13:33

## 2019-01-01 RX ADMIN — SODIUM CHLORIDE: 9 INJECTION, SOLUTION INTRAVENOUS at 15:41

## 2019-01-01 RX ADMIN — POTASSIUM CHLORIDE 20 MEQ: 1.5 POWDER, FOR SOLUTION ORAL at 13:34

## 2019-01-01 RX ADMIN — CEFTRIAXONE SODIUM 1 G: 1 INJECTION, SOLUTION INTRAVENOUS at 08:33

## 2019-01-01 RX ADMIN — POTASSIUM CHLORIDE AND SODIUM CHLORIDE: 450; 150 INJECTION, SOLUTION INTRAVENOUS at 15:26

## 2019-01-01 RX ADMIN — CEFTRIAXONE SODIUM 1 G: 1 INJECTION, SOLUTION INTRAVENOUS at 08:17

## 2019-01-01 RX ADMIN — POTASSIUM CHLORIDE AND SODIUM CHLORIDE: 450; 150 INJECTION, SOLUTION INTRAVENOUS at 10:04

## 2019-01-01 RX ADMIN — POTASSIUM CHLORIDE, DEXTROSE MONOHYDRATE AND SODIUM CHLORIDE: 150; 5; 450 INJECTION, SOLUTION INTRAVENOUS at 21:22

## 2019-01-01 RX ADMIN — POTASSIUM CHLORIDE 40 MEQ: 1.5 POWDER, FOR SOLUTION ORAL at 11:32

## 2019-01-01 RX ADMIN — POTASSIUM CHLORIDE AND SODIUM CHLORIDE: 450; 150 INJECTION, SOLUTION INTRAVENOUS at 01:13

## 2019-01-01 RX ADMIN — POTASSIUM CHLORIDE AND SODIUM CHLORIDE: 450; 150 INJECTION, SOLUTION INTRAVENOUS at 01:28

## 2019-01-01 ASSESSMENT — ACTIVITIES OF DAILY LIVING (ADL)
ADLS_ACUITY_SCORE: 43
ADLS_ACUITY_SCORE: 39
ADLS_ACUITY_SCORE: 45
ADLS_ACUITY_SCORE: 43
ADLS_ACUITY_SCORE: 39
ADLS_ACUITY_SCORE: 45
ADLS_ACUITY_SCORE: 45
ADLS_ACUITY_SCORE: 41
ADLS_ACUITY_SCORE: 43
ADLS_ACUITY_SCORE: 23
ADLS_ACUITY_SCORE: 45
ADLS_ACUITY_SCORE: 41
ADLS_ACUITY_SCORE: 39
ADLS_ACUITY_SCORE: 43
ADLS_ACUITY_SCORE: 43
ADLS_ACUITY_SCORE: 45
ADLS_ACUITY_SCORE: 39
ADLS_ACUITY_SCORE: 40
ADLS_ACUITY_SCORE: 43
ADLS_ACUITY_SCORE: 45
ADLS_ACUITY_SCORE: 40
ADLS_ACUITY_SCORE: 45
ADLS_ACUITY_SCORE: 45
ADLS_ACUITY_SCORE: 41
ADLS_ACUITY_SCORE: 45
AMBULATION: 4-->COMPLETELY DEPENDENT
ADLS_ACUITY_SCORE: 41
ADLS_ACUITY_SCORE: 40
ADLS_ACUITY_SCORE: 40
ADLS_ACUITY_SCORE: 41
ADLS_ACUITY_SCORE: 43
ADLS_ACUITY_SCORE: 43
ADLS_ACUITY_SCORE: 45
ADLS_ACUITY_SCORE: 43
ADLS_ACUITY_SCORE: 39
ADLS_ACUITY_SCORE: 45
ADLS_ACUITY_SCORE: 43
ADLS_ACUITY_SCORE: 40
ADLS_ACUITY_SCORE: 41
ADLS_ACUITY_SCORE: 43
ADLS_ACUITY_SCORE: 42
ADLS_ACUITY_SCORE: 45
ADLS_ACUITY_SCORE: 45
ADLS_ACUITY_SCORE: 43
ADLS_ACUITY_SCORE: 41
ADLS_ACUITY_SCORE: 47
ADLS_ACUITY_SCORE: 45
ADLS_ACUITY_SCORE: 43
ADLS_ACUITY_SCORE: 47
ADLS_ACUITY_SCORE: 39
RETIRED_COMMUNICATION: 2-->DIFFICULTY SPEAKING (NOT RELATED TO LANGUAGE BARRIER)
ADLS_ACUITY_SCORE: 45
ADLS_ACUITY_SCORE: 45

## 2019-01-01 ASSESSMENT — MIFFLIN-ST. JEOR
SCORE: 1017.25
SCORE: 970.25
SCORE: 988.25
SCORE: 1011.25
SCORE: 966.25
SCORE: 1045.52
SCORE: 982.93
SCORE: 1005.25
SCORE: 979.25
SCORE: 1006.25

## 2019-01-15 ENCOUNTER — NURSING HOME VISIT (OUTPATIENT)
Dept: GERIATRICS | Facility: OTHER | Age: 84
End: 2019-01-15
Attending: NURSE PRACTITIONER
Payer: MEDICARE

## 2019-01-15 DIAGNOSIS — K21.9 GASTROESOPHAGEAL REFLUX DISEASE WITHOUT ESOPHAGITIS: ICD-10-CM

## 2019-01-15 DIAGNOSIS — E03.9 ACQUIRED HYPOTHYROIDISM: ICD-10-CM

## 2019-01-15 DIAGNOSIS — G30.1 LATE ONSET ALZHEIMER'S DISEASE WITH BEHAVIORAL DISTURBANCE (H): Primary | ICD-10-CM

## 2019-01-15 DIAGNOSIS — F02.818 LATE ONSET ALZHEIMER'S DISEASE WITH BEHAVIORAL DISTURBANCE (H): Primary | ICD-10-CM

## 2019-01-15 DIAGNOSIS — R13.19 OTHER DYSPHAGIA: ICD-10-CM

## 2019-01-15 PROCEDURE — 99309 SBSQ NF CARE MODERATE MDM 30: CPT | Performed by: NURSE PRACTITIONER

## 2019-01-16 NOTE — PROGRESS NOTES
[facility]   [facility]  60 DAY RECERTIFICATION    Patient Name: Tika Au  YOB: 1933 Age: 85 year old  Medical RecordNumber: [id number]  Primary Provider: Dr. Maria    Advanced Directives: DNR call Sd with change in condition    Date admitted to [facility]: 7/2015      HPI: Patient is seen today for 60 day evaluation for the following medical issues:  Dementia, Gerd, hypothyroidism, and trouble swallowing.   Pt has been slowly declining without any acute episodes or illnesses. About 6 months ago had a tooth removed that was infected. No further concerns at this time per nursing staff, other than now having some swallowing difficulty and has been doing well when switched to pureed diet as she was pocketing food and medications. She will spontaneously walk at times but is not safe to walk on her own. Weight has been stable. Does not seem to struggle with pain. Mostly pleasant and content.     ALLERGIES:  No Known Allergies    Past Medical History:    has a past medical history of Alzheimer's disease, Hyperlipidemia, and Hypothyroidism.    Past Surgical History:   has a past surgical history that includes fracture surgery; Appendectomy open; Sigmoidoscopy flexible; and Extracapsular cataract extration with intraocular lens implant.    Family History:  family history includes Other - See Comments in her brother, father, and mother.    Social History:   reports that she quit smoking about 2 years ago. Her smoking use included cigarettes. she has never used smokeless tobacco. She reports that she does not drink alcohol.    Immunizations:     There is no immunization history on file for this patient.    Current Medications:   Current Outpatient Medications   Medication     acetaminophen (TYLENOL) 325 MG tablet     Elastic Bandages & Supports (MEDICAL COMPRESSION STOCKINGS) MISC     levothyroxine (SYNTHROID/LEVOTHROID) 50 MCG tablet     magnesium hydroxide (MILK OF MAGNESIA) 400  MG/5ML suspension     mirtazapine (REMERON) 7.5 MG TABS tablet     Multiple Vitamin (MULTI-VITAMINS) TABS     NUTRITIONAL SUPPLEMENT LIQD     omeprazole (PRILOSEC) 20 MG CR capsule     valACYclovir (VALTREX) 500 MG tablet     No current facility-administered medications for this visit.      Medication reconciliation completed between [facility] record and NH MAR.     Diagnostics/Labs (reviewed today):  Routine labs only--last done in May 2018-stable    Current Diet: Pureed diet    Review Of Systems:    Limited ROS due to dementia. See HPI.        Vital Signs: (obtained from nursing home record)  Temp 97.1   Pulse 59   Resp 17   /66   O2 Sats 93%   Weight 158.6 #     Objective:     Pleasant elderly lady without distress. Quite sleepy today.     Sclera nonicteric, conjunctiva non-inflamed.  Skin color pink. Mucous membranes moist.   Neck supple and without adenopathy   Lungs clear to auscultation throughout, although not taking deep breaths. Cardiovascular regular, S1, S2 auscultated. No murmur noted.  Abdomen soft and without tenderness   Extremities without edema. Able to move upper and lower extremities       Assessment and Plan:  (G30.1,  F02.81) Late onset Alzheimer's disease with behavioral disturbance  (primary encounter diagnosis)  Comment: progressive  Plan: supportive and comfort cares    (K21.9) Gastroesophageal reflux disease without esophagitis  Comment: stable, historically would randomly vomit after meals  Plan: Now stable with prilosec 20 mg daily    (E03.9) Acquired hypothyroidism  Comment: stable-last TSH in May was ok  Plan: Continue synthroid 50 mcg daily    (R13.19) Other dysphagia  Comment: progressive likely due to dementia  Plan: Supportive cares, now on mechanical soft diet and crushing meds and tolerating this well.         Multidisciplinary notes, laboratory values, medications, vital signs, weight and orders are reviewed from nursing home records. I have reviewed the patient s medical  history and updated the computerized patient record.     A total of 34 minutes was spent with this patient, of which more than 50% was spent in counseling and/or coordination of care.   GREY Orona, CNP  1/16/2019 3:29 PM

## 2019-01-17 PROBLEM — R13.19 OTHER DYSPHAGIA: Status: ACTIVE | Noted: 2019-01-17

## 2019-05-30 NOTE — PROGRESS NOTES
[facility]   [facility]  60 DAY RECERTIFICATION    Patient Name: Tika Au  YOB: 1933 Age: 85 year old  Medical RecordNumber: [id number]  Primary Provider: Dr. Collier    Advanced Directives: DNR selective treatment    Date admitted to [facility]: 7/2015      HPI: Patient is seen today for 60 day evaluation for the following medical issues:  Alzheimers disease, GERD, hypothyroidism.   Pt is stable. Weight is stable. NO further tooth issues at this time. Pleasant, smiling today. Nonsensible talking. Nursing staff with no concerns.   Pt on omeprazole for acid reflux symptoms where she would unexpectedly vomit a small amount after she had laid down in bed. Symptoms have now resolved and has been on omeprazole for at least a year now. Will trial off again.     ALLERGIES:  No Known Allergies    Past Medical History:    has a past medical history of Alzheimer's disease, Hyperlipidemia, and Hypothyroidism.    Past Surgical History:   has a past surgical history that includes fracture surgery; Appendectomy open; Sigmoidoscopy flexible; and Extracapsular cataract extration with intraocular lens implant.    Family History:  family history includes Other - See Comments in her brother, father, and mother.    Social History:   reports that she quit smoking about 2 years ago. Her smoking use included cigarettes. She has never used smokeless tobacco. She reports that she does not drink alcohol.    Immunizations:     There is no immunization history on file for this patient.    Current Medications:   Current Outpatient Medications   Medication     acetaminophen (TYLENOL) 325 MG tablet     Elastic Bandages & Supports (MEDICAL COMPRESSION STOCKINGS) MISC     levothyroxine (SYNTHROID/LEVOTHROID) 50 MCG tablet     magnesium hydroxide (MILK OF MAGNESIA) 400 MG/5ML suspension     mirtazapine (REMERON) 7.5 MG TABS tablet     Multiple Vitamin (MULTI-VITAMINS) TABS     NUTRITIONAL SUPPLEMENT LIQD      omeprazole (PRILOSEC) 20 MG CR capsule     valACYclovir (VALTREX) 500 MG tablet     No current facility-administered medications for this visit.      Medication reconciliation completed between [facility] record and NH MAR.     Diagnostics/Labs (reviewed today):  5/22/19  Thyrotropin 2.71    Lab Results   Component Value Date    WBC 10.1 05/22/2019     Lab Results   Component Value Date    RBC 4.65 05/22/2019     Lab Results   Component Value Date    HGB 13.5 05/22/2019     Lab Results   Component Value Date    HCT 42.0 05/22/2019     No components found for: MCT  Lab Results   Component Value Date    MCV 90 05/22/2019     Lab Results   Component Value Date    MCH 29.0 05/22/2019     Lab Results   Component Value Date    MCHC 32.1 05/22/2019     Lab Results   Component Value Date    RDW 13.9 05/22/2019     Lab Results   Component Value Date     05/22/2019     Last Comprehensive Metabolic Panel:  Sodium   Date Value Ref Range Status   05/22/2019 138 134 - 144 mmol/L Final     Potassium   Date Value Ref Range Status   05/22/2019 3.6 3.5 - 5.1 mmol/L Final     Chloride   Date Value Ref Range Status   05/22/2019 104 98 - 107 mmol/L Final     Carbon Dioxide   Date Value Ref Range Status   05/22/2019 23 21 - 31 mmol/L Final     Anion Gap   Date Value Ref Range Status   05/22/2019 11 3 - 14 mmol/L Final     Glucose   Date Value Ref Range Status   05/22/2019 111 (H) 70 - 105 mg/dL Final     Urea Nitrogen   Date Value Ref Range Status   05/22/2019 15 7 - 25 mg/dL Final     Creatinine   Date Value Ref Range Status   05/22/2019 0.73 0.60 - 1.20 mg/dL Final     GFR Estimate   Date Value Ref Range Status   05/22/2019 76 >60 mL/min/[1.73_m2] Final     Calcium   Date Value Ref Range Status   05/22/2019 9.3 8.6 - 10.3 mg/dL Final         Current Diet: Pureed diet    Review Of Systems:    Limited ROS due to dementia. See HPI.   Smiling and laughing today. Nonsensible tallk.     Vital Signs: (obtained from nursing home record)  Temp  95.5  Pulse 66   Resp 14   /80   O2 Sats 95%   Weight 148.4 #     Objective:     Pleasant and alert without distress. Smiling and laughing. Non-sensible talking.  Sclera nonicteric, conjunctiva non-inflamed.  Skin color pink. Mucous membranes moist. Poor dentition.   Neck supple and without adenopathy   Lungs clear   Cardiovascular regular  Abdomen soft and without tenderness   Extremities without edema.   Able to move upper and lower extremities       Assessment and Plan:  (G30.1,  F02.81) Late onset Alzheimer's disease with behavioral disturbance  (primary encounter diagnosis)  Comment: progressive  Plan: comfort cares and supportive measures. Anticipate needs. Eating well. Stop multivitamin. Also gets nutritional supplements. Check annual labs: CBC, BMP    (K21.9) Gastroesophageal reflux disease without esophagitis  Comment: resolved  Plan: Trial off omeprazole.     (E03.9) Hypothyroidism, unspecified type  Comment: stable  Plan: Last TSH normal. Continue same dose synthroid.         Multidisciplinary notes, laboratory values, medications, vital signs, weight and orders are reviewed from nursing home records. I have reviewed the patient s medical history and updated the computerized patient record.     A total of 35 minutes was spent with this patient, of which more than 50% was spent in counseling and/or coordination of care.   Nereyda Price, GREY, CNP  5/30/2019 10:20 AM

## 2019-08-08 NOTE — TELEPHONE ENCOUNTER
Julita from [facility] called wondering when you will next be rounding there to be seeing this patient.  She is needing a recert    Please call to advise    Thank you

## 2019-08-11 NOTE — PROGRESS NOTES
"  [facility]    No chief complaint on file.      HPI:    Tika Au is a 86 year old  (6/29/1933), who is being seen today for a federally mandated E/M visit at [facility]. She previously had been cared for by Dr. Collier and has been followed by Nereyda Price NP on nursing home rounds.   HPI information obtained from: facility staff and medical chart. Today's concerns are: None.  Tika seems to be doing well.      ICD-10-CM    1. Late onset Alzheimer's disease with behavioral disturbance G30.1     F02.81    2. Hypothyroidism, unspecified type E03.9    3. Essential hypertension I10      She had labs in May. She is comfort care.  She has hypothyroidism on Synthroid. She had issues with reflux but now is off PPI and doing fine.     Past medical history, past surgical history, social history, allergies and medication list available at  [facility] are reviewed today.      Case Management:  Information reviewed:  Medications, vital signs, orders, and nursing notes.    ROS:  Review of Systems unable due to dementia    Exam:  Vitals: BP (!) 157/87   Pulse 90   Temp 97  F (36.1  C)   Resp 18   Ht 1.575 m (5' 2\")   Wt 65.2 kg (143 lb 12.8 oz)   LMP  (LMP Unknown)   SpO2 94%   Breastfeeding? No   BMI 26.30 kg/m    BMI= Body mass index is 26.3 kg/m .  Physical Exam     Alert cooperative smiling, speaks in word salad.  Laughs spontaneously.  No complaints reported from staff.  No behaviors.  Mucous membranes are moist.  No scleral icterus.  Lungs are clear, no rales rhonchi or wheezes are heard.  Cardiac RRR without murmur.  Abdomen is soft and nontender.  No pedal edema.    Lab/Diagnostic data:     CBC RESULTS:   Recent Labs   Lab Test 05/22/19  0815 05/09/18  0726   WBC 10.1 7.7   RBC 4.65 4.51   HGB 13.5 13.1   HCT 42.0 40.5   MCV 90 90   MCH 29.0 29.0   MCHC 32.1 32.3   RDW 13.9 13.5    315       Last Basic Metabolic Panel:  Recent Labs   Lab Test 05/22/19  0815 05/09/18  0726 "    137   POTASSIUM 3.6 4.1   CHLORIDE 104 103   VIRGIE 9.3 9.1   CO2 23 26   BUN 15 12   CR 0.73 0.82   * 92       Liver Function Studies -   Recent Labs   Lab Test 05/13/15  1138 03/28/14  1341   PROTTOTAL 6.9 7.4   ALBUMIN 4.3 4.2   BILITOTAL 0.6  --    ALKPHOS 74  --        No results found for: TSH]    No results found for: A1C    ASSESSMENT:    ICD-10-CM    1. Late onset Alzheimer's disease with behavioral disturbance G30.1     F02.81    2. Hypothyroidism, unspecified type E03.9    3. Essential hypertension I10        PLAN:    Medications reviewed and renewed. Plan of care reviewed and renewed. Continue current medications and plan of care.  Continue comfort cares.      Electronically signed by:  Enrico Corrigan MD  8/11/2019  1:40 PM

## 2019-09-11 NOTE — PROGRESS NOTES
[facility]    No chief complaint on file.      HPI:    Tika Au is a 86 year old  (6/29/1933), who is being seen today for a federally mandated E/M visit at [facility]. I last saw her a month ago. She is on comfort care.  She had labs in May which were reviewed and fine.  She has hypothyroidism which is well controlled.  No further reflux symptoms off her PPI.  HPI information obtained from: facility staff and medical chart. Today's concerns are: none. She is headed for her bath.       ICD-10-CM    1. Late onset Alzheimer's disease with behavioral disturbance G30.1     F02.81    2. Hypothyroidism, unspecified type E03.9    3. Bilateral lower extremity edema R60.0    4. Nursing home resident Z59.3        Past medical history, past surgical history, social history, allergies and medication list available at [facility] are reviewed today.      Case Management:  Information reviewed:  Medications, vital signs, orders, and nursing notes.    ROS:  Review of Systems     Unable due to dementia.    Exam:  Vitals: /72   Pulse 82   Temp 97.4  F (36.3  C)   Resp 16   Wt 64.9 kg (143 lb)   LMP  (LMP Unknown)   SpO2 94%   BMI 26.16 kg/m    BMI= Body mass index is 26.16 kg/m .  Physical Exam     Pleasant elderly lady smiling but did not speak today.  No complaints reported from staff.  No behaviors.  No scleral icterus.  Mucous membranes are moist.  Lungs are clear, no rales rhonchi or wheezes are heard.  Cardiac RRR without murmur pedal edema.    Lab/Diagnostic data:     CBC RESULTS:   Recent Labs   Lab Test 05/22/19  0815 05/09/18  0726   WBC 10.1 7.7   RBC 4.65 4.51   HGB 13.5 13.1   HCT 42.0 40.5   MCV 90 90   MCH 29.0 29.0   MCHC 32.1 32.3   RDW 13.9 13.5    315       Last Basic Metabolic Panel:  Recent Labs   Lab Test 05/22/19  0815 05/09/18  0726    137   POTASSIUM 3.6 4.1   CHLORIDE 104 103   VIRGIE 9.3 9.1   CO2 23 26   BUN 15 12   CR 0.73 0.82   * 92        Liver Function Studies -   Recent Labs   Lab Test 05/13/15  1138 03/28/14  1341   PROTTOTAL 6.9 7.4   ALBUMIN 4.3 4.2   BILITOTAL 0.6  --    ALKPHOS 74  --        No results found for: TSH]        Component Value Flag Ref Range Units Status Collected Lab   Thyrotropin 2.71   0.34 - 5.60 IU/mL Final 05/22/2019  8:15 AM [facility]         No results found for: A1C    ASSESSMENT:    ICD-10-CM    1. Late onset Alzheimer's disease with behavioral disturbance G30.1     F02.81    2. Hypothyroidism, unspecified type E03.9    3. Bilateral lower extremity edema R60.0    4. Nursing home resident Z59.3        PLAN:    Medications reviewed and renewed. Plan of care reviewed and renewed. Continue current medications and plan of care. Continue comfort cares.     Electronically signed by:  Enrico Corrigan MD  9/11/2019  2:25 PM

## 2019-10-30 PROBLEM — R13.19 OTHER DYSPHAGIA: Status: RESOLVED | Noted: 2019-01-17 | Resolved: 2019-01-01

## 2019-10-30 NOTE — PROGRESS NOTES
[facility]  60 DAY RECERTIFICATION    Patient Name: Tika Au  YOB: 1933 Age: 86 year old  Medical RecordNumber: [id number]  Primary Provider: Dr. Corrigan    Advanced Directives: DNR limited interventions    Date admitted to [facility]: 7/2015      HPI: Patient is seen today for 60 day evaluation for the following medical issues:  alzheimers disease, gerd, and hypothyroidism.   Pt is stable. Resting in recliner. [place]. Talks non-sensical speech and laughs. Seems comfortable and relaxed.   Nursing staff with no concerns.  Gerd is stable. Currently not taking medication for this. Her symptoms were vomiting up randomly but mostly at bedtime. She is no longer doing this.     ALLERGIES:  No Known Allergies    Past Medical History:    has a past medical history of Alzheimer's disease, Hyperlipidemia, and Hypothyroidism.    Past Surgical History:   has a past surgical history that includes fracture surgery; Appendectomy open; Sigmoidoscopy flexible; and Extracapsular cataract extration with intraocular lens implant.    Family History:  family history includes Other - See Comments in her brother, father, and mother.    Social History:   reports that she quit smoking about 3 years ago. Her smoking use included cigarettes. She has never used smokeless tobacco. She reports that she does not drink alcohol.    Immunizations:     There is no immunization history on file for this patient.    Current Medications:   Current Outpatient Medications   Medication     acetaminophen (TYLENOL) 325 MG tablet     Elastic Bandages & Supports (MEDICAL COMPRESSION STOCKINGS) MISC     levothyroxine (SYNTHROID/LEVOTHROID) 50 MCG tablet     magnesium hydroxide (MILK OF MAGNESIA) 400 MG/5ML suspension     NUTRITIONAL SUPPLEMENT LIQD     valACYclovir (VALTREX) 500 MG tablet     No current facility-administered medications for this visit.      Medication reconciliation completed between [facility] record  and NH MAR.     Diagnostics/Labs (reviewed today):  Labs checked in May were stable.     Current Diet: Reg diet    Review Of Systems:    Limited ROS due to dementia. See HPI.        Vital Signs: (obtained from nursing home record)  Temp 98.3   Pulse 78   Resp 18   /78   O2 Sats 97%   Weight 133.6 #     Objective:     Pleasant and alert without distress. Smiles. Non-sensical.    Sclera nonicteric, conjunctiva non-inflamed.  Skin color pink.   Neck supple and without adenopathy   Lungs clear   Cardiovascular regular  Abdomen soft and without tenderness   Extremities without edema.    Gait is stable.   Able to move upper and lower extremities       Assessment and Plan:  (G30.1,  F02.81) Late onset Alzheimer's disease with behavioral disturbance (H)  (primary encounter diagnosis)  Comment: progressive  Plan: supportive cares, comfort cares    (K21.9) Gastroesophageal reflux disease without esophagitis  Comment: stable  Plan: currently taking no medications    (E03.9) Acquired hypothyroidism  Comment: stable  Plan: cont synthroid, TSH stable in May.         Multidisciplinary notes, laboratory values, medications, vital signs, weight and orders are reviewed from nursing home records. I have reviewed the patient s medical history and updated the computerized patient record.     A total of 40 minutes was spent with this patient, of which more than 50% was spent in counseling and/or coordination of care.   GREY Orona, CNP  10/30/2019 2:10 PM

## 2019-11-19 NOTE — PROGRESS NOTES
[facility]  Acute Care Visit    Patient Name: Tika Au   : 1933  MRN: [id number]    Place of Service: [facility]  DOS: 2019    CC: lock jaw    HPI:  Tika Au is a 86 year old female with PMH of multiple chronic medical concerns, who is seen today regarding pt was noted to have lock jaw and unable to close all the way. She does not seem in distress as she is smiling and content as always. She is still able to take in fluids and soft foods and medications crushed.   It is not determined whether she has had a history of this.       Multidisciplinary notes, laboratory values, medications, vital signs, weight and orders arereviewed from nursing home records. I have reviewed the patient s medical history and updated the computerized patient record.     PMH:  Past Medical History:   Diagnosis Date     Alzheimer's disease     No Comments Provided     Hyperlipidemia     No Comments Provided     Hypothyroidism     No Comments Provided       Medications:  Current Outpatient Medications   Medication Sig Dispense Refill     acetaminophen (TYLENOL) 325 MG tablet Take 650 mg by mouth every 4 hours as needed . Max acetaminophen dose: 4000mg in 24 hrs.       Elastic Bandages & Supports (MEDICAL COMPRESSION STOCKINGS) MISC MISCELLANEOUS MEDICAL SUPPLY (GRADUATED COMPRESSION STOCKINGS). Mild compression stockings feet to knees on during day off at night       levothyroxine (SYNTHROID/LEVOTHROID) 50 MCG tablet Take 50 mcg by mouth daily       magnesium hydroxide (MILK OF MAGNESIA) 400 MG/5ML suspension Take 1 Tablespoonful by mouth as needed for bowel management.       NUTRITIONAL SUPPLEMENT LIQD House supplement TID between meals for loss of appetite.       valACYclovir (VALTREX) 500 MG tablet Take 500 mg by mouth daily       Medication reconciliation complete between Epic record and NH MAR.     Allergies:  No Known Allergies    Review of Systems:  Limited ROS due to dementia. See  HPI.       Vital Signs:  Temp 97.0  Pulse 54   Respirations 16   /58  Oxygen Sats 95%   Weight 143.8#    Physical Exam:     Pleasant and alert without distress.  [place].   Neck supple and without adenopathy   Bilateral cheeks and jaw muscles tense and jaw is locked slightly open.     Assessment/Plan:  (M26.609) TMJ (temporomandibular joint syndrome)  (primary encounter diagnosis)  Comment: acute bilateral muscle tension in cheeks causing jaws to lock  Plan: trial ibuprofen 600 mg BID x 3 days to give an anti-inflammatory.   If this does not work, then trial baclofen 5 mg q 6 hours prn muscle spasm  Apply heat and massage to loosen muscles.         A total of 16 minutes was spent with this patient, of which more than 50% was spent in counseling and/or coordination of care.     GREY Orona, CNP ....................  11/18/2019   7:37 PM

## 2019-12-09 NOTE — TELEPHONE ENCOUNTER
Coordinator at [facility] called and needs patient to get in with [facility] as soon as she can for lock jaw.   Dorcas Van on 12/9/2019 at 2:28 PM

## 2019-12-10 NOTE — TELEPHONE ENCOUNTER
MARCELINO - Julita at [facility] called here in a panic.  Need to get patient into see Dr Shekhar CALDWELL for lockjaw.  Left message yesterday but no response. Please call her today asap.  Dia Phillips on 12/10/2019 at 12:09 PM

## 2019-12-10 NOTE — TELEPHONE ENCOUNTER
See other message. The patient was given an appointment tomorrow.  Lisa Garcia LPN..................12/10/2019   1:15 PM

## 2019-12-10 NOTE — TELEPHONE ENCOUNTER
The patient was given an appointment tomorrow with Enrico Corrigan MD.  Lisa Garcia LPN..................12/10/2019   1:04 PM

## 2019-12-11 PROBLEM — N17.9 ARF (ACUTE RENAL FAILURE) (H): Status: ACTIVE | Noted: 2019-01-01

## 2019-12-11 PROBLEM — S03.00XA JAW DISLOCATION: Status: ACTIVE | Noted: 2019-01-01

## 2019-12-11 PROBLEM — E87.0 HYPERNATREMIA: Status: ACTIVE | Noted: 2019-01-01

## 2019-12-11 NOTE — ED PROVIDER NOTES
History     Chief Complaint   Patient presents with     Jaw Pain     HPI  Tika Au is a 86 year old female who sent over to the emergency department from the clinic with recurrent jaw dislocation and decreased p.o. intake over the last couple of weeks.  Recent notes from [facility] are reviewed.  Recent notes from Dr. Corrigan were reviewed.  However staff from [facility] stated they contacted the son who wanted her to come into the ER for management.  No family is available and no history is able to be obtained from the patient.    Allergies:  No Known Allergies    Problem List:    Patient Active Problem List    Diagnosis Date Noted     Gastroesophageal reflux disease without esophagitis 05/09/2018     Priority: Medium     Nursing home resident 09/19/2017     Priority: Medium     Comfort measures only status 04/16/2016     Priority: Medium     Weight loss 04/16/2016     Priority: Medium     Bilateral lower extremity edema 02/01/2016     Priority: Medium     Late onset Alzheimer's disease with behavioral disturbance (H) 02/01/2016     Priority: Medium     Recurrent herpes simplex 02/01/2016     Priority: Medium     Ankle edema 12/19/2015     Priority: Medium     Hyperlipidemia 05/13/2015     Priority: Medium     Hypertension 05/13/2015     Priority: Medium     Personal history of tobacco use, presenting hazards to health 02/17/2011     Priority: Medium     Alzheimer's disease (H) 10/26/2009     Priority: Medium     Overview:   moderate memory loss, mme 22. on 12/2005       Hypothyroidism 10/26/2009     Priority: Medium        Past Medical History:    Past Medical History:   Diagnosis Date     Alzheimer's disease (H)      Hyperlipidemia      Hypothyroidism        Past Surgical History:    Past Surgical History:   Procedure Laterality Date     APPENDECTOMY OPEN      No Comments Provided     EXTRACAPSULAR CATARACT EXTRATION WITH INTRAOCULAR LENS IMPLANT      2010,bilateral     FRACTURE SURGERY      age  "42,Rib resection     SIGMOIDOSCOPY FLEXIBLE      3/99       Family History:    Family History   Problem Relation Age of Onset     Other - See Comments Mother         GI Disease, at 58, ruptured her bowel     Other - See Comments Father          in his 40's of lead poisoning related to painting occupation     Other - See Comments Brother        Social History:  Marital Status:   [5]  Social History     Tobacco Use     Smoking status: Former Smoker     Types: Cigarettes     Last attempt to quit: 10/4/2016     Years since quitting: 3.1     Smokeless tobacco: Never Used   Substance Use Topics     Alcohol use: No     Alcohol/week: 0.0 standard drinks     Drug use: Unknown     Types: Other     Comment: Drug use: No        Medications:    levothyroxine (SYNTHROID/LEVOTHROID) 50 MCG tablet  acetaminophen (TYLENOL) 325 MG tablet  baclofen (LIORESAL) 10 MG tablet  Elastic Bandages & Supports (MEDICAL COMPRESSION STOCKINGS) MISC  magnesium hydroxide (MILK OF MAGNESIA) 400 MG/5ML suspension  NUTRITIONAL SUPPLEMENT LIQD  valACYclovir (VALTREX) 500 MG tablet          Review of Systems   Unable to perform ROS: Dementia       Physical Exam   BP: 111/67  Pulse: 72  Heart Rate: 77  Temp: 96.4  F (35.8  C)  Resp: 24  Height: 157.5 cm (5' 2\")  Weight: 59 kg (130 lb)  SpO2: 96 %      Physical Exam  Nursing note reviewed.   HENT:      Right Ear: Tympanic membrane normal.      Left Ear: Tympanic membrane normal.   Eyes:      Pupils: Pupils are equal, round, and reactive to light.       Obviously dislocated maxilla with inability to close her mouth.  Appears to be anteriorly dislocated.  This was supported by x-ray.  ED Course        [facility]    Jaw Dislocation  Date/Time: 2019 2:46 PM  Performed by: Tapan Earl MD  Authorized by: Tapan Earl MD       PRE PROCEDURE DETAILS      Injury location:  Bilateral TMJ    Chronicity:  Recurrent    Range of motion: reduced      PROCEDURE DETAILS      " Manipulation performed: yes      Jaw reduction method:  Downward posterior pressure    Reduction successful: yes      X-ray confirmed reduction: not done.      POST PROCEDURE DETAILS      Range of motion: normal      PROCEDURE   Patient Tolerance:  Patient tolerated the procedure well with no immediate complications            Results for orders placed or performed during the hospital encounter of 12/11/19 (from the past 24 hour(s))   CBC with platelets differential   Result Value Ref Range    WBC 14.8 (H) 4.0 - 11.0 10e9/L    RBC Count 5.92 (H) 3.8 - 5.2 10e12/L    Hemoglobin 17.2 (H) 11.7 - 15.7 g/dL    Hematocrit 56.0 (H) 35.0 - 47.0 %    MCV 95 78 - 100 fl    MCH 29.1 26.5 - 33.0 pg    MCHC 30.7 (L) 31.5 - 36.5 g/dL    RDW 14.7 10.0 - 15.0 %    Platelet Count 186 150 - 450 10e9/L    Diff Method Automated Method     % Neutrophils 81.4 %    % Lymphocytes 12.4 %    % Monocytes 5.3 %    % Eosinophils 0.0 %    % Basophils 0.2 %    % Immature Granulocytes 0.7 %    Absolute Neutrophil 12.1 (H) 1.6 - 8.3 10e9/L    Absolute Lymphocytes 1.8 0.8 - 5.3 10e9/L    Absolute Monocytes 0.8 0.0 - 1.3 10e9/L    Absolute Eosinophils 0.0 0.0 - 0.7 10e9/L    Absolute Basophils 0.0 0.0 - 0.2 10e9/L    Abs Immature Granulocytes 0.1 0 - 0.4 10e9/L   Basic metabolic panel   Result Value Ref Range    Sodium 167 (HH) 134 - 144 mmol/L    Potassium 3.9 3.5 - 5.1 mmol/L    Chloride 131 (HH) 98 - 107 mmol/L    Carbon Dioxide 18 (L) 21 - 31 mmol/L    Anion Gap 18 (H) 3 - 14 mmol/L    Glucose 164 (H) 70 - 105 mg/dL    Urea Nitrogen 88 (H) 7 - 25 mg/dL    Creatinine 2.47 (H) 0.60 - 1.20 mg/dL    GFR Estimate 19 (L) >60 mL/min/[1.73_m2]    GFR Estimate If Black 22 (L) >60 mL/min/[1.73_m2]    Calcium 9.8 8.6 - 10.3 mg/dL   XR Mandible G/E 4 Views    Narrative    PROCEDURE: XR MANDIBLE G/E 4 VW 12/11/2019 11:05 AM    HISTORY: r/o dislocation, lock jaw, inability to close the mouth for  the past several weeks    COMPARISONS: None.    TECHNIQUE:  Mandible 4 views    FINDINGS: No acute fracture. The temporomandibular joints may be  dislocated anteriorly, although images are slightly limited by  imposition of multiple bony structures and the patient's osteopenia.  There are degenerative changes in the partially visualized cervical  spine.       Impression    IMPRESSION: No acute fracture. Question anterior dislocation of the  mandible with respect to the temporomandibular joints.    JAVED PIKE MD       Medications   0.9% sodium chloride BOLUS (0 mLs Intravenous Stopped 12/11/19 1330)     Followed by   sodium chloride 0.9% infusion (1,000 mLs Intravenous New Bag 12/11/19 1333)   fentaNYL (PF) (SUBLIMAZE) injection 50 mcg (50 mcg Intravenous Given 12/11/19 1252)   fentaNYL (PF) (SUBLIMAZE) injection 50 mcg (50 mcg Intravenous Given 12/11/19 1323)   midazolam (VERSED) injection 1 mg (1 mg Intravenous Given 12/11/19 1323)       Assessments & Plan (with Medical Decision Making)     I have reviewed the nursing notes.    I have reviewed the findings, diagnosis, plan and need for follow up with the patient.    New Prescriptions    No medications on file       Final diagnoses:   Dehydration   Acute kidney injury (H)   Hypernatremia   Profound dehydration due to malnourishment.  Discussed with Dr. jean.  He will will admit patient for IV fluid resuscitation.  Mandible was successfully reduced here in the emergency department.  Ace wrap splint was applied.  Patient hemodynamically stable.    12/11/2019   [facility] MD  12/11/19 [id number]

## 2019-12-11 NOTE — ED TRIAGE NOTES
Pt brought to ER from clinic.  She was seen there because her jaw is locked.  It's been locked for about 2 weeks now, she hasn't been able to eat or drink.  She has a hx of her jaw locking in the past but it would usually pop back in to place.

## 2019-12-11 NOTE — PROGRESS NOTES
" NS ADMISSION NOTE    Patient admitted to room 301 at approximately 1640 via cart from emergency room. Patient was accompanied by transport tech.     Verbal SBAR report received from Danika prior to patient arrival.     Patient trasferred to bed via slip Patient alert and oriented X 1. Pain is controlled with current analgesics.  Medication(s) being used: narcotic analgesics including Fentanyl.  . Admission vital signs: Blood pressure 110/56, pulse 66, temperature 95.6  F (35.3  C), temperature source Temporal, resp. rate 17, height 1.575 m (5' 2\"), weight 57.3 kg (126 lb 5.2 oz), SpO2 98 %, not currently breastfeeding. Patient was oriented to plan of care, call light, bed controls, tv, telephone, bathroom and visiting hours.     Risk Assessment    The following safety risks were identified during admission: fall and skin. Yellow risk band applied: YES.     Skin Initial Assessment    This writer admitted this patient and completed a full skin assessment and Jameson score in the Adult PCS flowsheet. Appropriate interventions initiated as needed.     Secondary skin check completed by Sheron No reddened or open areas noted. Patient does have some scattered bruises.  Patient was placed on capno, vital signs stable.   Buck catheter placed, per md order, no urine return, spoke with md, will monitor, md expected low if no urine output.  Patient lower limbs, cool to touch, discolored..    Jameson Risk Assessment  Sensory Perception: 2-->very limited  Moisture: 4-->rarely moist  Activity: 1-->bedfast  Mobility: 2-->very limited  Nutrition: 2-->probably inadequate  Friction and Shear: 2-->potential problem  Jameson Score 13      Cheli Lao RN    "

## 2019-12-11 NOTE — H&P
[facility]    History and Physical  Hospitalist       Date of Admission:  12/11/2019    Assessment & Plan   Tika Au is a 86 year old female who presents with jaw dislocation, poor oral intake and severe hypernatremia and acute renal failure.    Principal Problem:    Hypernatremia    Assessment: Likely acute to subacute due to poor oral intake and severe dehydration with significant free water deficit.  After very pleasant goals of care discussion with patient's medical POA Sd he would like to attempt to correct the hypernatremia and renal failure with IV fluids.    Plan: -Normal saline 125 ml/hr    - repeat BMP this afternoon and tonight to consider changes in fluid rate correction and frequency of sodium checks.     Jaw Dislocation  Assessment: Status post reduction although difficult, , with moderate sedation in the ER.  Periods of spontaneous but self resolved apnea afterward, she is otherwise remained hemodynamically stable.  Son Chet discussed with other brother regarding further management considerations if this occurs again.  Plan: - Continuous capnography   - avoid over extension of jaw   - n.p.o. with oral cares until sensorium improves    Active Problems:    Alzheimer's disease (H)    Assessment: Moderate to severe, no longer mobile but apparently recognizes her children enjoys listening to music.    Plan: - Further life-sustaining efforts pending clinical course    - she remains DNR/DNI with management of easily reversible conditions.   - recent tsh wnl - continue home synthroid when able to take po       Hypertension    Assessment: stable and not actively treated    Plan: - monitor      ARF (acute renal failure) (H)    Assessment: likely prerenal    Plan: - monitor daily   - hammond for strict I/o    FEN: npo diet, normal saline at 125mL/hr, mg/k replacement protocol  PPX: SCD's     Code Status: No Order    Jose Guadalupe Abel    Primary Care Physician   Physician No  Ref-Primary    Chief Complaint   Jaw dislocation    History is obtained from the patient and chart review.    History of Present Illness   Tika Au is a 86 year old female who presents with dislocated jaw and severe hyponatremia as well as acute renal failure.  Patient has suffered a spontaneous jaw dislocation at [facility] nursing facility on 11/12/2019.  At that time a trial of ibuprofen and baclofen as well as heat and massage to loosen the muscles was recommended.  She was apparently able to take in fluids and soft food as well as crushed medication.  Per report her oral intake has dramatically decreased and was seen in the clinic today and subsequently sent to the ER.  There with 100 mics of fentanyl and Versed job was successfully reduced.  She had intermittent and spontaneously self resolving apneas post procedurally and after goals of care discussion with son was subsequently admitted for further management.    Past Medical History    I have reviewed this patient's medical history and updated it with pertinent information if needed.   Past Medical History:   Diagnosis Date     Alzheimer's disease (H)     No Comments Provided     Hyperlipidemia     No Comments Provided     Hypothyroidism     No Comments Provided       Past Surgical History   I have reviewed this patient's surgical history and updated it with pertinent information if needed.  Past Surgical History:   Procedure Laterality Date     APPENDECTOMY OPEN      No Comments Provided     EXTRACAPSULAR CATARACT EXTRATION WITH INTRAOCULAR LENS IMPLANT      2010,bilateral     FRACTURE SURGERY      age 42,Rib resection     SIGMOIDOSCOPY FLEXIBLE      3/99       Prior to Admission Medications   Prior to Admission Medications   Prescriptions Last Dose Informant Patient Reported? Taking?   Elastic Bandages & Supports (MEDICAL COMPRESSION STOCKINGS) MISC   Yes No   Sig: MISCELLANEOUS MEDICAL SUPPLY (GRADUATED COMPRESSION STOCKINGS). Mild compression  stockings feet to knees on during day off at night   NUTRITIONAL SUPPLEMENT LIQD   Yes No   Sig: House supplement TID between meals for loss of appetite.   acetaminophen (TYLENOL) 325 MG tablet   Yes No   Sig: Take 650 mg by mouth every 4 hours as needed . Max acetaminophen dose: 4000mg in 24 hrs.   baclofen (LIORESAL) 10 MG tablet   Yes No   Sig: Take 0.5 tablets (5 mg) by mouth 2 times daily as needed for muscle spasms   levothyroxine (SYNTHROID/LEVOTHROID) 50 MCG tablet 12/10/2019 at am  Yes Yes   Sig: Take 50 mcg by mouth daily   magnesium hydroxide (MILK OF MAGNESIA) 400 MG/5ML suspension   Yes No   Sig: Take 1 Tablespoonful by mouth as needed for bowel management.   valACYclovir (VALTREX) 500 MG tablet   Yes No   Sig: Take 500 mg by mouth daily      Facility-Administered Medications: None     Allergies   No Known Allergies    Social History   I have reviewed this patient's social history and updated it with pertinent information if needed. Tika Au  reports that she quit smoking about 3 years ago. Her smoking use included cigarettes. She has never used smokeless tobacco. She reports that she does not drink alcohol.    Family History   I have reviewed this patient's family history and updated it with pertinent information if needed.   Family History   Problem Relation Age of Onset     Other - See Comments Mother         GI Disease, at 58, ruptured her bowel     Other - See Comments Father          in his 40's of lead poisoning related to painting occupation     Other - See Comments Brother        Review of Systems     REVIEW OF SYSTEMS: Patient is nonverbal and unable to give any meaningful information therefore 12 point review systems unable to be obtained.    Physical Exam   Temp: 96.4  F (35.8  C) Temp src: Tympanic BP: 102/72 Pulse: 66 Heart Rate: 68 Resp: 14 SpO2: 94 % O2 Device: None (Room air)    Vital Signs with Ranges  Temp:  [96.4  F (35.8  C)] 96.4  F (35.8  C)  Pulse:  [66-79]  66  Heart Rate:  [68-79] 68  Resp:  [8-24] 14  BP: (102-154)/() 102/72  SpO2:  [76 %-97 %] 94 %  130 lbs 0 oz    Exam:  GENERAL: Laying in bed, poorly responsive, in no apparent distress.  Head: normocephalic and atraumatic  Eyes: anicteric and non-injected sclera, eyes are spontaneously open, she has not been blinking her eyes.  Nose: no rhinorrhea or epistaxis.   Throat: Dry mucous membranes.  NECK: Supple, jugular venous distension not present.  CARDIOVASCULAR: regular rate and rhythm, no murmurs, rubs, or gallops. Normal S1/S2. No lower extremity edema.   RESPIRATORY: Intermittent periods of apnea which spontaneously resolve, clear to auscultation bilaterally spontaneous breathing, no wheezing or crackles, no accessory muscle use or evidence of respiratory distress.  GI: soft, non-tender, non-distended, normoactive bowel sounds.  MUSCULOSKELETAL: warm and well perfused, 2+ dorsalis pedis pulses.    SKIN: Diffuse pallor, no jaundice or rashes.  NEUROLOGY: AAOx0, not able to follow any commands, nonverbal, no spontaneous movement.       Data   Data reviewed today:  I personally reviewed no images or EKG's today.  Recent Labs   Lab 12/11/19  1025   WBC 14.8*   HGB 17.2*   MCV 95      *   POTASSIUM 3.9   CHLORIDE 131*   CO2 18*   BUN 88*   CR 2.47*   ANIONGAP 18*   VIRGIE 9.8   *       Recent Results (from the past 24 hour(s))   XR Mandible G/E 4 Views    Narrative    PROCEDURE: XR MANDIBLE G/E 4 VW 12/11/2019 11:05 AM    HISTORY: r/o dislocation, lock jaw, inability to close the mouth for  the past several weeks    COMPARISONS: None.    TECHNIQUE: Mandible 4 views    FINDINGS: No acute fracture. The temporomandibular joints may be  dislocated anteriorly, although images are slightly limited by  imposition of multiple bony structures and the patient's osteopenia.  There are degenerative changes in the partially visualized cervical  spine.       Impression    IMPRESSION: No acute fracture.  Question anterior dislocation of the  mandible with respect to the temporomandibular joints.    JAVED PIKE MD

## 2019-12-11 NOTE — ED NOTES
Pt having periods of apnea lasting 10-15 seconds.  Dr. Abel present in room to see apneic periods.  Dr. Earl notified.

## 2019-12-11 NOTE — PROGRESS NOTES
"Tika Au 86 year old female   Admission date:12/11/2019   Code status: DNR/DNI   Isolation:No active isolations   Principal Problem:Hypernatremia   Diet: n  Discharge timeline & plan: unsure of discharge date and/or discharge needs at this time.  Vital signs:  Temp: 95.6  F (35.3  C) Temp src: Temporal BP: 110/56 Pulse: 66 Heart Rate: 62 Resp: 17 SpO2: 98 % O2 Device: None (Room air)   Height: 157.5 cm (5' 2\") Weight: 57.3 kg (126 lb 5.2 oz)  Estimated body mass index is 23.1 kg/m  as calculated from the following:    Height as of this encounter: 1.575 m (5' 2\").    Weight as of this encounter: 57.3 kg (126 lb 5.2 oz).  Abnormal Physical Assessment:lower ext cool, discolored.scattered bruises.   Last Pain/PRN Medication given:Fentanyl in ER  Patient on capno. Patient on 2 liters to maintain sa02 in low 90's  Pending tests/procedures planned:labs      SAFETY CHECKLIST  Arm Bands/signs/magnets (code status, fall risk, allergy, limb, etc.) in place:YES  IVF/Medications/rate ordered infusing (do they match the order?):YES  Physical assessment (wounds, incisions, dopplers, LDA's, neuro, CIWA, etc.)YES   Environmental assessment (bed/chair alarm on, call light, side rails, restraints, etc.):{YES   Whiteboard updated:YES    Bedside Handoff complete, safety checks verified by writer and are correct.    Lisa Valdivia RN on 12/11/2019 at 7:10 PM              "

## 2019-12-11 NOTE — PROGRESS NOTES
Patient returned from Radiology. During xrays, patient began becoming more alert and responsive. Personal aid had to leave the hospital, notified Anitha and Chasidy RN of my concerns of her pulling IV and moving around without someone with her. Chasidy was going to notify Danika of her change of alertness.

## 2019-12-11 NOTE — ED NOTES
"Per SBAR from [facility], \"60 day recent & lockjaw, tried ibuprofen, baclofen, warm/hot packs, massage, resident has decreased fluid & food intake, has had weight loss.  Dentist doesn't have any other recommendations, PT/OT doesn't work residents & lockjaw\"   Caregiver at bedside reports she doesn't think pt has been getting her daily medications because even small squirts of water from a syringe just oozes back out of her mouth.   "

## 2019-12-11 NOTE — PHARMACY-ADMISSION MEDICATION HISTORY
Pharmacy -- Admission Medication Reconciliation    Prior to admission (PTA) medications were reviewed and the patient's PTA medication list was updated.    Sources Consulted: [facility] [place] [facility], [facility]    The reliability of this Medication Reconciliation is: Reliability: Reliable    The following significant changes were made:    Removed APAP prn    Added bisacodyl prn    Added vagisil prn    Added biotene gel        In addition, the patient's allergies were reviewed with the patient and updated as follows:   Allergies: Patient has no known allergies.    The pharmacist has reviewed with the patient that all personal medications should be removed from the building or locked in the belongings safe.  Patient shall only take medications ordered by the physician and administered by the nursing staff.       Medication barriers identified: VIRI   Medication adherence concerns: VIRI   Understanding of emergency medications: VIRI Hernadez RPH, 12/11/2019,  4:31 PM

## 2019-12-11 NOTE — PROGRESS NOTES
There are no exam notes on file for this visit.    SUBJECTIVE:  Tika Au  is a 86 year old female who comes in today from [facility] where she was seen by Nereyda Price about 3 weeks ago.  She was having inability to close her jaw because of bilateral muscle tension in her cheeks.  She was put on ibuprofen for 3 days and given some baclofen and heat was applied.  She had no significant symptoms and seem to be unaffected by this and was smiling and seemed comfortable.    She has Alzheimer's dementia.    Apparently really has not had very good p.o. intake over the course of the last couple of weeks.  For the last couple of days really has not had much n.p.o. at all.  She is comfort measures only status.  I spoke with Eileen one of the nurses at [facility] and when they talked to the son, he wanted her evaluated right away and to have this taken care of.  We discussed the comfort only status and the fact that she has end-stage dementia but it sounds as if family wants us to move ahead with treatment.    She is accompanied by an aide who normally cares for her and says that 3 days ago she was smiling and happy and now is significantly changed.  She is lost about 15 pounds over the last couple of weeks.    Past Medical, Family, and Social History reviewed and updated as noted below.   ROS unable due to dementia and current status.    No Known Allergies,   Family History   Problem Relation Age of Onset     Other - See Comments Mother         GI Disease, at 58, ruptured her bowel     Other - See Comments Father          in his 40's of lead poisoning related to painting occupation     Other - See Comments Brother    ,   Current Outpatient Medications   Medication     acetaminophen (TYLENOL) 325 MG tablet     baclofen (LIORESAL) 10 MG tablet     Elastic Bandages & Supports (MEDICAL COMPRESSION STOCKINGS) MISC     levothyroxine (SYNTHROID/LEVOTHROID) 50 MCG tablet     magnesium hydroxide  (MILK OF MAGNESIA) 400 MG/5ML suspension     NUTRITIONAL SUPPLEMENT LIQD     valACYclovir (VALTREX) 500 MG tablet     No current facility-administered medications for this visit.    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 on 12/2005       Hypothyroidism 10/26/2009     Priority: Medium   ,   Past Surgical History:   Procedure Laterality Date     APPENDECTOMY OPEN      No Comments Provided     EXTRACAPSULAR CATARACT EXTRATION WITH INTRAOCULAR LENS IMPLANT      2010,bilateral     FRACTURE SURGERY      age 42,Rib resection     SIGMOIDOSCOPY FLEXIBLE      3/99    and   Social History     Tobacco Use     Smoking status: Former Smoker     Types: Cigarettes     Last attempt to quit: 10/4/2016     Years since quitting: 3.1     Smokeless tobacco: Never Used   Substance Use Topics     Alcohol use: No     Alcohol/week: 0.0 standard drinks     OBJECTIVE:  LMP  (LMP Unknown)    EXAM:  Minimally responsive.   Mouth is open.  Mucous membranes are dry.  She has sunken eyes and appears dehydrated.  Lungs are clear, cardiac RRR without murmur.  ASSESSMENT/Plan :    Diagnoses and all orders for this visit:    Dislocation of temporomandibular joint, initial encounter    Late onset Alzheimer's disease with behavioral disturbance (H)    Nursing home resident    Comfort measures only status    Dehydration      After discussing with the nurse at [facility], family wants us to treat her.  She will be moved to the emergency room.  Will likely need IV fluids and oxygen and to be resuscitated to the point where she would tolerate sedation and reduction of this.  Not sure that she will actually survive this.  She is DNR/DNI.  I spoke with Dr. Earl who is working in the emergency room and will assume care.    Enrico Corrigan MD

## 2019-12-11 NOTE — ED NOTES
After Visit Summary   11/27/2017    Roxanna Tao    MRN: [id number]           Patient Information     Date Of Birth          1979        Visit Information        Provider Department      11/27/2017 5:00 PM AN ALLERGY SHOTS [facility] [place]        Today's Diagnoses     Allergic rhinitis due to pollen    -  1       Follow-ups after your visit        Your next 10 appointments already scheduled     Jan 03, 2018  5:00 PM CST   Nurse Only with AN ALLERGY SHOTS   [facility] [place] ([facility] [place])    [address]  [place] [address]   [phone]            Yusuf 10, 2018  5:00 PM CST   Nurse Only with AN ALLERGY SHOTS   [facility] [place] ([facility] [place])    [address]  [place] [address]   [phone]            Yusuf 15, 2018  5:00 PM CST   Nurse Only with AN ALLERGY SHOTS   [facility] [place] ([facility] [place])    [address]  [place] [address]   [phone]            Jan 22, 2018  5:00 PM CST   Nurse Only with AN ALLERGY SHOTS   [facility] [place] ([facility] [place])    [address]  [place] [address]   [phone]            Jan 29, 2018  5:00 PM CST   Nurse Only with AN ALLERGY SHOTS   [facility] [place] ([facility] [place])    [address]  [place] [address]   [phone]            Feb 05, 2018  5:20 PM CST   Nurse Only with AN ALLERGY SHOTS   [facility] [place] ([facility] [place])    [address]  [place] [address]   [phone]            Feb 19, 2018  5:00 PM CST   Nurse Only with AN ALLERGY SHOTS   [facility] [place] ([facility] [place])    [address]  [place] [address]   [phone]            Feb 26, 2018  5:00 PM CST   Nurse Only with AN ALLERGY SHOTS   [facility] [place] ([facility] [place])    [address]  [place] [address]   [phone]              Who to contact   Pt spitting out a small amount of blood, appears to be biting her tongue.  MD notified.   Attempted to push tongue back in, pt is not biting her tongue.  Blood wiped off lips.  Pt resting quietly.    "   If you have questions or need follow up information about today's clinic visit or your schedule please contact [facility] directly at [phone].  Normal or non-critical lab and imaging results will be communicated to you by MyChart, letter or phone within 4 business days after the clinic has received the results. If you do not hear from us within 7 days, please contact the clinic through MyChart or phone. If you have a critical or abnormal lab result, we will notify you by phone as soon as possible.  Submit refill requests through [facility] or call your pharmacy and they will forward the refill request to us. Please allow 3 business days for your refill to be completed.          Additional Information About Your Visit        [email or web address] Information     [facility] lets you send messages to your doctor, view your test results, renew your prescriptions, schedule appointments and more. To sign up, go to [email or web address] . Click on \"Log in\" on the left side of the screen, which will take you to the Welcome page. Then click on \"Sign up Now\" on the right side of the page.     You will be asked to enter the access code listed below, as well as some personal information. Please follow the directions to create your username and password.     Your access code is: [email or web address]  Expires: 2018 12:43 PM     Your access code will  in 90 days. If you need help or a new code, please call your [facility] or [phone].        Care EveryWhere ID     This is your Care EveryWhere ID. This could be used by other organizations to access your [facility] medical records  [id number]         Blood Pressure from Last 3 Encounters:   17 120/83    Weight from Last 3 Encounters:   17 71.3 kg (157 lb 3.2 oz)              We Performed the Following     Allergy Shot: One injection        Primary Care Provider Office Phone # Fax #    [facility] [phone]       [id number] " [facility] [id number]        Equal Access to Services     [facility] : Hadii alice ku hadcharmaine Sopari, waaxda luqadaha, qaybta kaalmada alexsandrasamy, waxfede carine hayclarisse monahansandralawrence bennett. [facility] [phone].    ATENCIÓN: Si habla español, tiene a mc disposición servicios gratuitos de asistencia lingüística. [facility] [phone].    We comply with applicable federal civil rights laws and [place] laws. We do not discriminate on the basis of race, color, national origin, age, disability, sex, sexual orientation, or gender identity.            Thank you!     Thank you for choosing [facility] [place] [facility]  for your care. Our goal is always to provide you with excellent care. Hearing back from our patients is one way we can continue to improve our services. Please take a few minutes to complete the written survey that you may receive in the mail after your visit with us. Thank you!             Your Updated Medication List - Protect others around you: Learn how to safely use, store and throw away your medicines at www.disposemymeds.org.          This list is accurate as of: 11/27/17  6:04 PM.  Always use your most recent med list.                   Brand Name Dispense Instructions for use Diagnosis    ALLERGEN IMMUNOTHERAPY PRESCRIPTION     5 mL    Dust Mites DF 30,000AU/mL, HS  0.3 ml Dust Mites DP. 30,000 AU/mL, HS  0.3 ml  Jose, White 1:20 w/v, HS  1.0 ml [place] 1:20 w/v, HS 1.0 ml Diluent: HSA qs to 5ml    Allergic rhinitis due to dust mite       azelastine 0.1 % spray    ASTELIN    1 Bottle    Spray 2 sprays into both nostrils 2 times daily    Rhinoconjunctivitis       cetirizine 10 MG tablet    zyrTEC    30 tablet    Take 1 tablet (10 mg) by mouth daily    Rhinoconjunctivitis       EPINEPHrine 0.3 MG/0.3ML injection 2-pack    EPIPEN/ADRENACLICK/or ANY BX GENERIC EQUIV    0.6 mL    Inject 0.3 mLs (0.3 mg) into the muscle as needed for anaphylaxis    Need for desensitization to allergens       *  fluticasone 50 MCG/ACT spray    FLONASE     Spray 2 sprays into both nostrils daily        * fluticasone 50 MCG/ACT spray    FLONASE    1 Bottle    Spray 2 sprays into both nostrils 2 times daily Office visit for further refills    Chronic seasonal allergic rhinitis due to pollen       * SINGULAIR PO           * montelukast 10 MG tablet    SINGULAIR    30 tablet    Take 1 tablet (10 mg) by mouth At Bedtime Office visit for further refills.    Chronic seasonal allergic rhinitis due to pollen       * Notice:  This list has 4 medication(s) that are the same as other medications prescribed for you. Read the directions carefully, and ask your doctor or other care provider to review them with you.

## 2019-12-12 NOTE — PROGRESS NOTES
[facility]    Hospitalist Progress Note      Assessment & Plan   Tika Au is a 86 year old female who was admitted on 12/11/2019.     Principal Problem:    Hypernatremia    Assessment: stable.. Likely subacute to chronic due to poor oral intake and severe dehydration with significant free water deficit.  After very pleasant goals of care discussion with patient's medical POA Sd he would like to attempt to correct the hypernatremia and renal failure with IV fluids.    Plan: - d5 1/2 ns at 125 ml/hr for ongoing volume expansion   - bmp at 8pm and once renal function back to baseline transition to d5w for ongoing sodium correction.     Jaw Dislocation  Assessment: stable. Status post reduction although difficult with moderate sedation in the ER.  Periods of spontaneous but self resolved apnea afterward  Son Chet to discuss with his brother regarding further management considerations if this occurs again.  Plan: - avoid over extension of jaw              - dysphagia diet     Active Problems:    Alzheimer's disease (H)    Assessment: Moderate to severe, no longer mobile but apparently recognizes her children and enjoys listening to music.    Plan: - Further life-sustaining efforts pending clinical course               - she remains DNR/DNI with management of easily reversible conditions.              - recent tsh wnl   - resume home synthroid                  Hypertension    Assessment: stable and not actively treated    Plan: - monitor       ARF (acute renal failure) (H)    Assessment: likely prerenal and improving    Plan: - monitor daily              - stephany for strict I/o     FEN: ndd1 with nectar thick liquids, ivf as above, mg/k replacement protocol  PPX: SCD's      Code Status: DNR/DNI    Jose Guadalupe Abel    Interval History   Overnight significant improvement in sensorium and clinical stability, now spontaneously awake, not tracking significantly, not able to follow commands and  nonverbal.    -Data reviewed today: I reviewed all new labs and imaging results over the last 24 hours. I personally reviewed no images or EKG's today.    Physical Exam   Temp: 96.4  F (35.8  C) Temp src: Tympanic BP: (!) 72/59 Pulse: 69 Heart Rate: 58 Resp: 14 SpO2: 94 % O2 Device: Nasal cannula Oxygen Delivery: 2 LPM  Vitals:    12/11/19 0932 12/11/19 1638 12/12/19 0512   Weight: 59 kg (130 lb) 57.3 kg (126 lb 5.2 oz) 57.7 kg (127 lb 3.3 oz)     Vital Signs with Ranges  Temp:  [95.6  F (35.3  C)-97.6  F (36.4  C)] 96.4  F (35.8  C)  Pulse:  [69] 69  Heart Rate:  [58-62] 58  Resp:  [14-18] 14  BP: ()/(56-78) 72/59  SpO2:  [83 %-98 %] 94 %  I/O last 3 completed shifts:  In: 2440 [I.V.:2440]  Out: 375 [Urine:375]    Exam:  GENERAL: Laying in bed, spontaneously awake, in no apparent distress.  CARDIOVASCULAR: regular rate and rhythm, no murmurs, rubs, or gallops. Normal S1/S2. No lower extremity edema.   RESPIRATORY: resolved periods of apnea.  clear to ausculation bilaterally.   GI: soft, non-tender, non-distended, normoactive bowel sounds.  MUSCULOSKELETAL: warm and well perfused, 2+ dorsalis pedis pulses.    SKIN: Diffuse pallor, no jaundice or rashes.  NEUROLOGY: AAOx0, not able to follow any commands, nonverbal, no spontaneous movement.          Medications     0.45% sodium chloride + KCl 20 mEq/L 125 mL/hr at 12/12/19 0932       sodium chloride (PF)  3 mL Intracatheter Q8H       Data   Recent Labs   Lab 12/12/19  1247 12/12/19  0400 12/12/19  0101  12/11/19  1025   WBC  --  11.9*  --   --  14.8*   HGB  --  13.7  --   --  17.2*   MCV  --  99  --   --  95   PLT  --  145*  --   --  186   * 166* Test canceled by PCU/Clinic   < > 167*   POTASSIUM 3.8 3.8 Test canceled by PCU/Clinic   < > 3.9   CHLORIDE 136* 132* Test canceled by PCU/Clinic   < > 131*   CO2 19* 24 Test canceled by PCU/Clinic   < > 18*   BUN 61* 72* Test canceled by PCU/Clinic   < > 88*   CR 1.54* 1.89* Test canceled by PCU/Clinic   < >  2.47*   ANIONGAP 11 10 Test canceled by PCU/Clinic   < > 18*   VIRGIE 8.3* 8.2* Test canceled by PCU/Clinic   < > 9.8   GLC 81 261* Test canceled by PCU/Clinic   < > 164*    < > = values in this interval not displayed.       No results found for this or any previous visit (from the past 24 hour(s)).

## 2019-12-12 NOTE — PROGRESS NOTES
IV infiltrated and pulled out of arm, will insert another.    Lisa Valdivia RN on 12/11/2019 at 9:37 PM

## 2019-12-12 NOTE — PLAN OF CARE
"Respiratory rate very shallow with 10-15 second periods of apnea frequently ETCO2 - 0-21, unable to adequately assess lung sounds except for very diminished due to apnea and unable to follow commands. Body is tense when touched, moaning occasionally, hands are clammy and all extremities are cool. IV patent but very positional unable to administer any IV fluids in current location. Attempt to start IV by this nurse unsuccessful, second nurse called in to assist.     BP 99/68   Pulse 66   Temp 96.8  F (36  C) (Temporal)   Resp 14   Ht 1.575 m (5' 2\")   Wt 57.3 kg (126 lb 5.2 oz)   LMP  (LMP Unknown)   SpO2 97%   BMI 23.10 kg/m      Lisa Valdivia RN on 12/11/2019 at 7:59 PM    "

## 2019-12-12 NOTE — PLAN OF CARE
IV inserted by CRNA, bolus just completed and started continuous fluids per order, patient is without signs or symptoms of pain or discomfort, continues to have short periods of frequent apnea and snoring, LS very diminished due to shallow respirations and unable to follow commands. Urine is brown, purwick in place, reposition Q2h and PRN for skin and comfort continued.     Lisa Valdivia RN on 12/12/2019 at 1:32 AM

## 2019-12-12 NOTE — PROGRESS NOTES
Patient had incontinent void bypassing hammond catheter- no urinary output in catheter- hammond removed.  Patient has been opening eyes spontaneously this afternoon- no other changes regarding cognition/speech.  Is now on puree diet with nectar- did not eat anything for lunch.  No concerns noted with jaw movement or jaw locking up- Dr. Abel requested that staff monitor for this and notify him if it locks.  Patient continues to appear comfortable.  Has been up in chair for meals.  Updated son, Sd.  Jennifer Leggett RN on 12/12/2019 at 5:32 PM

## 2019-12-12 NOTE — PLAN OF CARE
Bladder scan at 0330 for 193, bladder scan at 0530 for greater than, straight cath for 275 ml and left hammond in.   Lisa Valdivia RN on 12/12/2019 at 6:59 AM

## 2019-12-12 NOTE — PROGRESS NOTES
Spoke with Dr. Abel, will change eye ointment to prn. Also notifies support under chin has moved, will continue to monitor and remove if it bothers the patient.

## 2019-12-12 NOTE — PROGRESS NOTES
Hammond not draining, large incontinent void required an entire bed change, white and thick discharge at insertion site of hammond, bladder scanned for 115 ml, hammond removed, attempted to reinsert x's 2, unsuccessful. Updated Dr. Abel with request to place a purwick and this was ok and done. Repositioned for comfort, IV is now patent and running bolus, rate clarified with Dr. Abel and will run over 2 hours due to Potassium. Currently resting comfortably in bed and body is relaxed and not tensed.     Lisa Valdivia RN on 12/11/2019 at 9:31 PM

## 2019-12-12 NOTE — PROGRESS NOTES
:    Patient is from [facility] in memory care.  I called Jennifer and gave her discharge update. Jennifer stated patient was receiving speech therapy and was on special feedings prior to arriving here.  Anticipated discharge in a few days back to [facility].  will continue to follow.    JOSE Crowe on 12/12/2019 at 9:48 AM

## 2019-12-12 NOTE — PLAN OF CARE
"Patient opens eyes to touch/pressure, does not talk- makes noises with activity.  Is NPO- oral cares completed- oral mucosa dark/dry/cracked, lips dry, teeth in poor repair.  No observed s/s pain per PAINAD scale- some moaning with activity- otherwise appears calm/comfortable- pillows in use for support and repositioned for comfort.  Lungs clear/diminished, shallow breathing, respiratory rate irregular with moments of apnea, is on 2 LPM oxygen and sats remain greater than 90%- capnography discontinued.  Bowel sounds active, heart distant/regular, no edema, peripheral pulses present/weak.  No urinary output thus far this shift.  BP (!) 72/59   Pulse 69   Temp 96.4  F (35.8  C) (Tympanic)   Resp 14   Ht 1.575 m (5' 2\")   Wt 57.7 kg (127 lb 3.3 oz)   LMP  (LMP Unknown)   SpO2 94%   BMI 23.27 kg/m   Jennifer Leggett RN on 12/12/2019 at 8:38 AM   "

## 2019-12-12 NOTE — PROGRESS NOTES
"0301/0301-01  Tika Au 86 year old female   Admission date:12/11/2019   Residence: [facility]  Code status: DNR/DNI   Isolation:No active isolations   Principal Problem:Hypernatremia     Diet: NPO except medications  Mobility Status: bed rest with check, change, and reposition Q2h  Discharge timeline & plan: unsure of discharge date and/or discharge needs at this time.  Vital signs:  Temp: 97.6  F (36.4  C) Temp src: Temporal BP: 100/78 Pulse: 69 Heart Rate: 62 Resp: 18 SpO2: 96 % O2 Device: Nasal cannula Oxygen Delivery: 2 LPM Height: 157.5 cm (5' 2\") Weight: 57.7 kg (127 lb 3.3 oz)  Estimated body mass index is 23.27 kg/m  as calculated from the following:    Height as of this encounter: 1.575 m (5' 2\").    Weight as of this encounter: 57.7 kg (127 lb 3.3 oz).  Abnormal Physical Assessment:see ntoes   Last Pain/PRN Medication given:see notes  Finger stick POCT blood sugars:see flowsheet  Labs: see labs  Telemetry: No  Pending tests/procedures planned:none  Comments:      SAFETY CHECKLIST  Arm Bands/Risk clasps correct and in place (DNR, Fall risk, Allergy, Latex, Limb):  Yes  IVF and rate ordered correct: Yes  Physical assessment verification(IV site, wounds, dressing intact, incisions, LDA's, neuro, CIWA...): Yes  Environmental assessment (bed/chair alarm on, call light, side rails, restraints, sitter....): Yes  Whiteboard updated by oncoming RN:Yes    Lisa Valdivia RN on 12/12/2019 at 7:33 AM              "

## 2019-12-13 NOTE — TELEPHONE ENCOUNTER
Called placed to Veronica on [facility] floor. Nurse not at desk.     Looks like patient is still in admitted? When is discharge?     Will have to speak with JVC/Nurse when work in can be done.     Jennifer Dave LPN on 12/13/2019 at 1:36 PM

## 2019-12-13 NOTE — PROGRESS NOTES
[facility]    Hospitalist Progress Note      Assessment & Plan   Tika Au is a 86 year old female who was admitted on 12/11/2019.      1.Hypernatremia --sodium is down to 161.  She has been getting half-normal saline 125 cc/h.  Her p.o. intake remains poor.  I will switch her fluid to D5 100 cc/h.  Check BMP Q4-6 hrs .  Goal is to bring the sodium down to 150s tomorrow    2.  ALONDRA  -improving     3. Jaw Dislocation  Status post reduction although difficult with moderate sedation in the ER.    Plan: - avoid over extension of jaw              - dysphagia diet     4.Alzheimer's disease (H)-she seems to have pretty advanced dementia                   5.Hypertension- stable and not actively treated    6.Poor p.o. intake-this is going to be a big problem for recurrent hospital admissions.  If her p.o. intake does not improve, we may have to discuss about feeding tube placement.  I think, best option was very low with hospice care.     7.FEN: ndd1 with nectar thick liquids, ivf as above, mg/k replacement protocol  PPX: SCD's      Code Status: DNR/DNI    Yadi Jack    Interval History   Sodium is improving slowly.  P.o. intake remains low    -Data reviewed today: I reviewed all new labs and imaging results over the last 24 hours. I personally reviewed     Physical Exam   Temp: 96.5  F (35.8  C) Temp src: Tympanic BP: 106/57 Pulse: 55 Heart Rate: 54 Resp: 16 SpO2: 90 % O2 Device: Nasal cannula Oxygen Delivery: 3 LPM  Vitals:    12/11/19 1638 12/12/19 0512 12/13/19 0500   Weight: 57.3 kg (126 lb 5.2 oz) 57.7 kg (127 lb 3.3 oz) 58.6 kg (129 lb 3 oz)     Vital Signs with Ranges  Temp:  [96.5  F (35.8  C)-97.5  F (36.4  C)] 96.5  F (35.8  C)  Pulse:  [55] 55  Heart Rate:  [54] 54  Resp:  [14-16] 16  BP: ()/(47-69) 106/57  SpO2:  [90 %-100 %] 90 %  I/O last 3 completed shifts:  In: 4286 [I.V.:3628]  Out: -     Exam:  GENERAL: Laying in bed, looks lethargic  CARDIOVASCULAR: regular rate  and rhythm, no murmurs, rubs,.   RESPIRATORY: resolved periods of apnea.  clear to ausculation ant   GI: soft, non-tender, non-distended, normoactive bowel sounds.  MUSCULOSKELETAL: warm and well perfused, 2+ dorsalis pedis pulses.    SKIN: no skin lesions   NEUROLOGY: awake, confused       Medications     0.45% sodium chloride + KCl 20 mEq/L 125 mL/hr at 12/13/19 0113       sodium chloride (PF)  3 mL Intracatheter Q8H       Data   Recent Labs   Lab 12/13/19  0424 12/12/19 2025 12/12/19  1247 12/12/19  0400  12/11/19  1025   WBC 7.7  --   --  11.9*  --  14.8*   HGB 12.0  --   --  13.7  --  17.2*   *  --   --  99  --  95   PLT 87*  --   --  145*  --  186   * 163* 166* 166*   < > 167*   POTASSIUM 4.4 4.3 3.8 3.8   < > 3.9   CHLORIDE 131* 133* 136* 132*   < > 131*   CO2 22 19* 19* 24   < > 18*   BUN 44* 52* 61* 72*   < > 88*   CR 1.42* 1.36* 1.54* 1.89*   < > 2.47*   ANIONGAP 8 11 11 10   < > 18*   VIRGIE 8.1* 8.1* 8.3* 8.2*   < > 9.8   GLC 90 86 81 261*   < > 164*    < > = values in this interval not displayed.       No results found for this or any previous visit (from the past 24 hour(s)).

## 2019-12-13 NOTE — PLAN OF CARE
"   12/13/19 0114   Cognitive   Cognitive/Neuro/Behavioral WDL ex;speech;orientation;level of consciousness   Level Of Consciousness obtunded   Arousal Level arouses to vigorous stimulation;arouses to repeated stimulation   Orientation disoriented x 4   Follows Commands no   Speech incomprehensible sounds     Patient obtunded, no visual tracking observed. BP 94/51   Pulse 55   Temp 97.1  F (36.2  C) (Tympanic)   Resp 16   Ht 1.575 m (5' 2\")   Wt 57.7 kg (127 lb 3.3 oz)   LMP  (LMP Unknown)   SpO2 100%   BMI 23.27 kg/m      "

## 2019-12-13 NOTE — PROGRESS NOTES
:    Called Jennifer at [facility] and provided discharge update. Anticipated discharge in 2-3 days.  will continue to follow.     VU Forbes on 12/13/2019 at 12:08 PM

## 2019-12-13 NOTE — PROGRESS NOTES
"0301/0301-01  Tika Au 86 year old female   Admission date:12/11/2019   Residence:   [facility]  Code status: DNR/DNI   Isolation:No active isolations   Principal Problem:Hypernatremia     Diet: pureed  thickened liquids    Mobility Status:  A2    Discharge timeline & plan: progressing to discharge. Anticipate return to LTC in a few days..  Vital signs:  Temp: 97.3  F (36.3  C) Temp src: Tympanic BP: 124/47 Pulse: 69 Heart Rate: 54 Resp: 14 SpO2: 99 % O2 Device: Nasal cannula Oxygen Delivery: 2 LPM Height: 157.5 cm (5' 2\") Weight: 57.7 kg (127 lb 3.3 oz)  Estimated body mass index is 23.27 kg/m  as calculated from the following:    Height as of this encounter: 1.575 m (5' 2\").    Weight as of this encounter: 57.7 kg (127 lb 3.3 oz).  Abnormal Physical Assessment:  Refer to notes   Last Pain/PRN Medication given:  NA    Labs:  Na+- 166  Telemetry: No  Pending tests/procedures planned:    Comments:        SAFETY CHECKLIST  Arm Bands/Risk clasps correct and in place (DNR, Fall risk, Allergy, Latex, Limb):  Yes  IVF and rate ordered correct: Yes  Physical assessment verification(IV site, wounds, dressing intact, incisions, LDA's, neuro, CIWA...): Yes  Environmental assessment (bed/chair alarm on, call light, side rails, restraints, sitter....): Yes  Whiteboard updated by oncoming RN:Yes    Jennifer Leggett RN on 12/12/2019 at 6:15 PM     Bedside Handoff complete, safety checks verified by writer and are correct.    Kyung Moore RN on 12/12/2019 at 7:23 PM          "

## 2019-12-13 NOTE — PROGRESS NOTES
"0301/0301-01  Tika Au 86 year old female   Admission date:12/11/2019   Residence: [place]  Code status: DNR/DNI   Isolation:No active isolations   Principal Problem:Hypernatremia   Diet: NDD1 pureed nectar thick liquids  Mobility Status: 2 assist   Discharge timeline & plan: a potential for discharge. No additional resources needed for discharge to home. Anticipated discharge   Vital signs:  Temp: 96.5  F (35.8  C) Temp src: Tympanic BP: 106/57 Pulse: 55 Heart Rate: 54 Resp: 16 SpO2: 90 % O2 Device: Nasal cannula Oxygen Delivery: 3 LPM Height: 157.5 cm (5' 2\") Weight: 58.6 kg (129 lb 3 oz)  Estimated body mass index is 23.63 kg/m  as calculated from the following:    Height as of this encounter: 1.575 m (5' 2\").    Weight as of this encounter: 58.6 kg (129 lb 3 oz).     Abnormal Physical Assessment: Disoriented x4 and obtunded/withdrawn. Has opened eyes 3-4 times today. Unable to follow commands. Only ate 1-2 bites of meals today. Lung sounds diminished. Continues to have high sodium levels and chloride.  Labs: Na+ 154. Cl- 128.   Telemetry: No  Pending tests/procedures planned: BMP every 4 hours.   Comments: Aspiration precautions and purewick has been working well. Check, change and reposition every 2 hours.     SAFETY CHECKLIST  Arm Bands/Risk clasps correct and in place (DNR, Fall risk, Allergy, Latex, Limb):  Yes  IVF and rate ordered correct: Yes  Physical assessment verification(IV site, wounds, dressing intact, incisions, LDA's, neuro, CIWA...): Yes  Environmental assessment (bed/chair alarm on, call light, side rails, restraints, sitter....): Yes  Whiteboard updated by oncoming RN:Yes    Iron Valenzuela RN 12/13/19 12:30 PM        Bedside Handoff complete, safety checks verified by writer and are correct.    Jennifer Maria RN.............................12/13/2019 7:07 PM      "

## 2019-12-13 NOTE — TELEPHONE ENCOUNTER
Reason for call: Patient wanting a work in appointment.    Patient is having the following symptoms:  hosp f/u for 1 day    The patient is requesting an appointment with  [facility] per medical floor    Was an appointment offered for this call? No    If Yes, what is the date of the appointment?  None available     Preferred method for responding to this message: Telephone Call    Phone number patient can be reached at? Other phone number:  Ext [phone] M/S/P    If we can't reach you directly, may we leave a detailed response at the number you provided? Yes    Can this message wait until your PCP/provider returns if unavailable today? No

## 2019-12-13 NOTE — PROGRESS NOTES
Updated patients son Eliud. No further questions or concerns at this time.    Iron Valenzuela, RN 12/13/19 1:02 PM

## 2019-12-13 NOTE — PROGRESS NOTES
"0301/0301-01  Tika Au 86 year old female   Admission date:12/11/2019   Residence:   Code status: DNR/DNI   Isolation:No active isolations   Principal Problem:Hypernatremia   Post Op Day #: NA  Peds:Not applicable  [place]: NA  Diet: thickened liquids  Mobility Status: X  Discharge timeline & plan: unsure of discharge date and/or discharge needs at this time.  Vital signs:  Temp: 97.5  F (36.4  C) Temp src: Tympanic BP: 101/63 Pulse: 55 Heart Rate: 54 Resp: 16 SpO2: 98 % O2 Device: Nasal cannula Oxygen Delivery: 2 LPM Height: 157.5 cm (5' 2\") Weight: 58.6 kg (129 lb 3 oz)  Estimated body mass index is 23.63 kg/m  as calculated from the following:    Height as of this encounter: 1.575 m (5' 2\").    Weight as of this encounter: 58.6 kg (129 lb 3 oz).  Abnormal Physical Assessment:         12/13/19 0114   Cognitive   Cognitive/Neuro/Behavioral WDL ex;speech;orientation;level of consciousness   Level Of Consciousness obtunded   Arousal Level arouses to vigorous stimulation;arouses to repeated stimulation   Orientation disoriented x 4   Follows Commands no   Speech incomprehensible sounds      Patient obtunded, no visual tracking observed. BP 94/51   Pulse 55   Temp 97.1  F (36.2  C) (Tympanic)   Resp 16   Ht 1.575 m (5' 2\")   Wt 57.7 kg (127 lb 3.3 oz)   LMP  (LMP Unknown)   SpO2 100%   BMI 23.27 kg/m               Last Pain/PRN Medication given:See MAR  Finger stick POCT blood sugars:NA  Labs:   Your basic metabolic panel results:  Lab Results   Component Value Date     12/13/2019       (Sodium/Salt)  Lab Results   Component Value Date    POTASSIUM 4.4 12/13/2019     Lab Results   Component Value Date    GLC 90 12/13/2019       (Glucose/Blood Sugar/Diabetic Screen)  Lab Results   Component Value Date    VIRGIE 8.1 12/13/2019       (Calcium)  Lab Results   Component Value Date    CR 1.42 12/13/2019       (Kidney Function)   Telemetry: No  Pending tests/procedures planned:NA  Comments:      SAFETY " CHECKLIST  Arm Bands/Risk clasps correct and in place (DNR, Fall risk, Allergy, Latex, Limb):  Yes  IVF and rate ordered correct: Yes  Physical assessment verification(IV site, wounds, dressing intact, incisions, LDA's, neuro, CIWA...): Yes  Environmental assessment (bed/chair alarm on, call light, side rails, restraints, sitter....): Yes  Whiteboard updated by oncoming RN:Yes    Kyung Moore RN on 12/13/2019 at 6:28 AM    Bedside Handoff complete, safety checks verified by writer and are correct.  Iron Valenzuela RN 12/13/19 7:38 AM

## 2019-12-13 NOTE — PROGRESS NOTES
"Patient disoriented x4 at morning assessment. Able to open eyes for about 30 seconds, otherwise obtunded. Opens eyes to vigorous stimulation but otherwise unable to follow commands. Unable to eat breakfast this morning. Continues to be on aspiration precautions. Lung sounds diminished throughout and heart rate regular. Bowel sounds active. Skin intact. Check, change and reposition every two hours. Vitals stable, /57   Pulse 55   Temp 96.5  F (35.8  C) (Tympanic)   Resp 16   Ht 1.575 m (5' 2\")   Wt 58.6 kg (129 lb 3 oz)   LMP  (LMP Unknown)   SpO2 90%   BMI 23.63 kg/m      Iron Valenzuela RN 12/13/19 9:14 AM        "

## 2019-12-13 NOTE — PROGRESS NOTES
MD updated:    Patients sodium returned at 154. Chloride 128. Orders to stop Dextrose 5% and restart 0.45 Nacl + KCL 2o meq @ 50 mL's per hour.     MD reports that if patients Na+ level drops to152 or during the night, orders to stop fluids completely and saline lock.     Will continue to monitor.     Iron Valenzuela RN 12/13/19 4:12 PM

## 2019-12-14 NOTE — PLAN OF CARE
"0301/0301-01  Tiak Au 86 year old female   Admission date:12/11/2019   Residence: [facility]  Code status: DNR/DNI   Isolation:No active isolations   Principal Problem:Hypernatremia   Diet: thickened liquids  Mobility Status: A2-bedrest  Discharge timeline & plan: unsure of discharge date and/or discharge needs at this time.  Vital signs:  Temp: 97.7  F (36.5  C) Temp src: Tympanic BP: 116/63 Pulse: 55 Heart Rate: 52 Resp: 18 SpO2: 93 % O2 Device: None (Room air) Oxygen Delivery: 2 LPM(Titrated to 1L) Height: 157.5 cm (5' 2\") Weight: 58.6 kg (129 lb 3 oz)  Estimated body mass index is 23.63 kg/m  as calculated from the following:    Height as of this encounter: 1.575 m (5' 2\").    Weight as of this encounter: 58.6 kg (129 lb 3 oz).  Abnormal Physical Assessment:garbled speech, increased sodium level   Labs:   Your basic metabolic panel results:  Lab Results   Component Value Date     12/14/2019         (Sodium/Salt)  Lab Results   Component Value Date    POTASSIUM 5.4 12/14/2019       Lab Results   Component Value Date    GLC 90 12/14/2019         (Glucose/Blood Sugar/Diabetic Screen)  Lab Results   Component Value Date    VIRGIE 8.1 12/14/2019         (Calcium)  Lab Results   Component Value Date    CR 1.02 12/14/2019         (Kidney Function)       Telemetry: No      SAFETY CHECKLIST  Arm Bands/Risk clasps correct and in place (DNR, Fall risk, Allergy, Latex, Limb):  Yes  IVF and rate ordered correct: Yes  Physical assessment verification(IV site, wounds, dressing intact, incisions, LDA's, neuro, CIWA...): Yes  Environmental assessment (bed/chair alarm on, call light, side rails, restraints, sitter....): Yes  Whiteboard updated by oncoming RN:Yes  Jennifer Maria RN.............................12/14/2019 4:20 AM      Bedside Handoff complete, safety checks verified by writer and are correct.    Report from DAYSI Rodriguez.   Kyung Woodward on 12/14/2019 at 7:10 AM              "

## 2019-12-14 NOTE — PLAN OF CARE
"/63 (BP Location: Left arm)   Pulse 55   Temp 97.7  F (36.5  C) (Tympanic)   Resp 18   Ht 1.575 m (5' 2\")   Wt 58.6 kg (129 lb 3 oz)   LMP  (LMP Unknown)   SpO2 93%   BMI 23.63 kg/m      Pt IV fluids stopped per MD order.  Re-draw labs increased to 155.  Per MD, fluids restarted throughout the night.  Will continue to monitor.  Jennifer Maria RN.............................12/14/2019 4:08 AM    "

## 2019-12-14 NOTE — PROGRESS NOTES
[facility]    Hospitalist Progress Note      Assessment & Plan   Tika Au is a 86 year old female who was admitted on 12/11/2019.      1.Hypernatremia --sodium is down to 150 this morning.  We will continue half-normal saline for now.  Goal is to get it down to 140s tomorrow.  Her p.o. intake remains poor.  Follow-up another sodium this afternoon just to make sure sodium is coming down slowly    2.  ALONDRA  -improving     3. Jaw Dislocation  Status post reduction in the ER  Her PO intake remains poor      4.Alzheimer's disease (H)-she seems to have pretty advanced dementia                5.Hypertension- stable and not actively treated    6.Poor p.o. intake-this is going to be a big problem for recurrent hospital admissions.   I try to talk to her sons today.  Left messages.  I have tried to discuss about goals of care changed to may be hospice depending on her situation.  But, I family demands everything to be continued, we may have to discuss about PEG tube placement.  He is currently on , Ndd1 with nectar thick liquids    7.PPX: SCD's      Code Status: DNR/DNI    Yadi Jack    Interval History   Sodium is improving slowly.  P.o. intake remains low    -Data reviewed today: I reviewed all new labs and imaging results over the last 24 hours. I personally reviewed     Physical Exam   Temp: 97.7  F (36.5  C) Temp src: Tympanic BP: 116/63 Pulse: 55 Heart Rate: 52 Resp: 18 SpO2: 93 % O2 Device: None (Room air) Oxygen Delivery: 2 LPM(Titrated to 1L)  Vitals:    12/12/19 0512 12/13/19 0500 12/14/19 0514   Weight: 57.7 kg (127 lb 3.3 oz) 58.6 kg (129 lb 3 oz) 62.4 kg (137 lb 9.1 oz)     Vital Signs with Ranges  Temp:  [96.1  F (35.6  C)-97.9  F (36.6  C)] 97.7  F (36.5  C)  Pulse:  [55] 55  Heart Rate:  [52] 52  Resp:  [16-18] 18  BP: (110-127)/(59-74) 116/63  SpO2:  [93 %-98 %] 93 %  I/O last 3 completed shifts:  In: 1423 [P.O.:60; I.V.:1363]  Out: 450 [Urine:450]    Exam:  GENERAL:   She is in sleep.  She can be arousable.  CARDIOVASCULAR: regular rate and rhythm, no murmurs, rubs,.   RESPIRATORY: Lung sounds clear anteriorly  GI: soft, non-tender, non-distended, normoactive bowel sounds.  MUSCULOSKELETAL:  No joint deformity  SKIN: no skin lesions   NEUROLOGY: awake, confused       Medications     0.45% sodium chloride + KCl 20 mEq/L 50 mL/hr (12/13/19 2045)       sodium chloride (PF)  3 mL Intracatheter Q8H       Data   Recent Labs   Lab 12/14/19  0520 12/14/19  0155 12/13/19 2040 12/13/19  0424  12/12/19  0400   WBC 8.6  --   --   --  7.7  --  11.9*   HGB 12.7  --   --   --  12.0  --  13.7   MCV 96  --   --   --  101*  --  99   *  --   --   --  87*  --  145*   * 155* 155*   < > 161*   < > 166*   POTASSIUM 4.0 5.4* 5.1   < > 4.4   < > 3.8   CHLORIDE 122* 127* 128*   < > 131*   < > 132*   CO2 19* 17* 18*   < > 22   < > 24   BUN 28* 30* 34*   < > 44*   < > 72*   CR 1.06 1.02 1.11   < > 1.42*   < > 1.89*   ANIONGAP 9 11 9   < > 8   < > 10   VIRGIE 8.2* 8.1* 8.3*   < > 8.1*   < > 8.2*   GLC 90 90 104   < > 90   < > 261*    < > = values in this interval not displayed.       No results found for this or any previous visit (from the past 24 hour(s)).

## 2019-12-14 NOTE — PROGRESS NOTES
Pt has erythema, non blanchable area to coccyx. Pt turned repo q 2 hrs. Incontinence care provided. Barrier cream applied. Alevyn foam dressing applied. Will continue to monitor.

## 2019-12-14 NOTE — PROGRESS NOTES
"0301/0301-01  Tika Au 86 year old female   Admission date:12/11/2019   Residence: [facility]  Code status: DNR/DNI   Isolation:No active isolations   Principal Problem:Hypernatremia   Post Op Day #: NA  Peds:No  [place]: NA  Diet: thickened liquids  Mobility Status: a2- bedrest, turn repo q 2 hrs.   Discharge timeline & plan: unsure of discharge date and/or discharge needs at this time.  Vital signs:  Temp: 97.3  F (36.3  C) Temp src: Tympanic BP: 113/61 Pulse: 55 Heart Rate: 56 Resp: 12 SpO2: 95 % O2 Device: None (Room air) Oxygen Delivery: 2 LPM(Titrated to 1L) Height: 157.5 cm (5' 2\") Weight: 62.4 kg (137 lb 9.1 oz)  Estimated body mass index is 25.16 kg/m  as calculated from the following:    Height as of this encounter: 1.575 m (5' 2\").    Weight as of this encounter: 62.4 kg (137 lb 9.1 oz).  Abnormal Physical Assessment: Garbled/ incomprehensible speech . Increase NA level. Erythema to coccyx, non blanchable. Alevyn applied. LS diminished. HR irreg. Bradycardic at times.    Last Pain/PRN Medication given:No pain   Finger stick POCT blood sugars:NA  Labs:   Your basic metabolic panel results:  Lab Results   Component Value Date     12/14/2019       (Sodium/Salt)  Lab Results   Component Value Date    POTASSIUM 4.0 12/14/2019     Lab Results   Component Value Date    GLC 90 12/14/2019       (Glucose/Blood Sugar/Diabetic Screen)  Lab Results   Component Value Date    VIRGIE 8.2 12/14/2019       (Calcium)  Lab Results   Component Value Date    CR 1.06 12/14/2019       (Kidney Function)       Your hemoglobin result  Lab Results   Component Value Date    HGB 12.7 12/14/2019       Telemetry: No  Pending tests/procedures planned:NA  Comments:      SAFETY CHECKLIST  Arm Bands/Risk clasps correct and in place (DNR, Fall risk, Allergy, Latex, Limb):  Yes  IVF and rate ordered correct: Yes  Physical assessment verification(IV site, wounds, dressing intact, incisions, LDA's, neuro, CIWA...): " Yes  Environmental assessment (bed/chair alarm on, call light, side rails, restraints, sitter....): Yes  Whiteboard updated by oncoming RN:Yes

## 2019-12-14 NOTE — PLAN OF CARE
Pt is lethargic/obtunded. Pt arouses to voice/stimulation. Disorientated x4. Pt turns head away from spoon when offering oral fluids. Oral fluids encouraged by staff. Aspirations precautions remain on. Pt unable to turn cough and deep breath. Writer unable to assess lung sounds due to pt unable to take deep breaths. LS diminished. HR irreg. Bradycardic at times. Pt remains a check/change,turn and repo q 2 hrs. BS active. Pt is resting. Will continue to monitor.

## 2019-12-15 NOTE — PLAN OF CARE
"0301/0301-01  Tika Au 86 year old female   Admission date:12/11/2019   Residence: [facility]  Code status: DNR/DNI   Isolation:No active isolations   Principal Problem:Hypernatremia   Diet: regular  Mobility Status: A2/bedrest  Discharge timeline & plan: unsure of discharge date and/or discharge needs at this time.  Vital signs:  Temp: 96.3  F (35.7  C) Temp src: Tympanic BP: 113/61 Pulse: 59 Heart Rate: 56 Resp: 16 SpO2: 92 % O2 Device: None (Room air) Oxygen Delivery: 2 LPM(Titrated to 1L) Height: 157.5 cm (5' 2\") Weight: 61.3 kg (135 lb 2.3 oz)  Estimated body mass index is 24.72 kg/m  as calculated from the following:    Height as of this encounter: 1.575 m (5' 2\").    Weight as of this encounter: 61.3 kg (135 lb 2.3 oz).  Abnormal Physical Assessment: red spot on bottom   Comments:  Pt more alert throughout shift.  Talking to empty chair but pleasant and follows commands.      SAFETY CHECKLIST  Arm Bands/Risk clasps correct and in place (DNR, Fall risk, Allergy, Latex, Limb):  Yes  IVF and rate ordered correct: Yes  Physical assessment verification(IV site, wounds, dressing intact, incisions, LDA's, neuro, CIWA...): Yes  Environmental assessment (bed/chair alarm on, call light, side rails, restraints, sitter....): Yes  Whiteboard updated by oncoming RN:Yes  Jennifer Maria RN.............................12/15/2019 3:59 AM            Bedside Handoff complete, safety checks verified by writer and are correct.  Report from DAYSI Rodriguez on 12/15/2019 at 7:14 AM    "

## 2019-12-15 NOTE — PLAN OF CARE
Pt speech remains garbled/incomprehensible. Disorientated x4. Pt shows no cues of pain. Pt has been more alert during morning hours. Talking to chair, smiling at staff. Writer unable to assess lung sounds. HR; bradycardic at times. Coccyx remains erythema non blanchable. Foam dressing applied. Pt repo q2 hrs. Check and change q 2 hrs. Pt withdraws from spoon, turning head away during feedings. 0 % consumed for breakfast. Pt Is resting comfortably. Will continue to monitor.

## 2019-12-15 NOTE — PROGRESS NOTES
[facility]    Hospitalist Progress Note      Assessment & Plan   Tika Au is a 86 year old female who was admitted on 12/11/2019.      1.Hypernatremia --sodium remains around 149.  Change fluid to D5.    2.  ALONDRA  -from prerenal.  It has improved.     3. Jaw Dislocation  Status post reduction in the ER  Her PO intake remains poor.      4.Alzheimer's disease (H)-she seems to have pretty advanced dementia                5.Hypertension- stable    6.Poor p.o. intake-this is going to be a big problem for recurrent hospital admissions.   I had a long discussion with her son yesterday.  He was not quite ready for hospice at this point.  But he agreed that,  she would not be a candidate for PEG tube placement.  He wanted to see, how she does over the next few weeks and take a decision at that point.  I will start her on small dose of Remeron to increase her appetite    7.PPX: SCD's      Code Status: DNR/DNI    Yadi Jack    Interval History   Sodium level remains at 149.  She looks more awake.    -Data reviewed today: I reviewed all new labs and imaging results over the last 24 hours. I personally reviewed     Physical Exam   Temp: 97  F (36.1  C) Temp src: Tympanic BP: 130/54 Pulse: 59 Heart Rate: 50 Resp: 16 SpO2: 98 % O2 Device: None (Room air)    Vitals:    12/13/19 0500 12/14/19 0514 12/15/19 0318   Weight: 58.6 kg (129 lb 3 oz) 62.4 kg (137 lb 9.1 oz) 61.3 kg (135 lb 2.3 oz)     Vital Signs with Ranges  Temp:  [95.7  F (35.4  C)-97.3  F (36.3  C)] 97  F (36.1  C)  Pulse:  [59] 59  Heart Rate:  [50-56] 50  Resp:  [12-16] 16  BP: (113-130)/(54-67) 130/54  SpO2:  [92 %-98 %] 98 %  I/O last 3 completed shifts:  In: 896 [P.O.:340; I.V.:556]  Out: 500 [Urine:500]    Exam:  GENERAL:  Looks more awake today.  Smiling  CARDIOVASCULAR:  Sounds are regular  RESPIRATORY: Clear anteriorly  GI: soft, non-tender, non-distended, normoactive bowel sounds.  MUSCULOSKELETAL:  No joint  deformity  SKIN: no skin lesions   NEUROLOGY: awake, confused       Medications     0.45% sodium chloride + KCl 20 mEq/L 50 mL/hr at 12/14/19 1526       sodium chloride (PF)  3 mL Intracatheter Q8H       Data   Recent Labs   Lab 12/15/19  0520 12/14/19  1914 12/14/19  0520  12/13/19  0424  12/12/19  0400   WBC  --   --  8.6  --  7.7  --  11.9*   HGB  --   --  12.7  --  12.0  --  13.7   MCV  --   --  96  --  101*  --  99   PLT  --   --  111*  --  87*  --  145*   * 149* 150*   < > 161*   < > 166*   POTASSIUM 4.0 3.8 4.0   < > 4.4   < > 3.8   CHLORIDE 116* 119* 122*   < > 131*   < > 132*   CO2 20* 20* 19*   < > 22   < > 24   BUN 20 23 28*   < > 44*   < > 72*   CR 1.00 1.05 1.06   < > 1.42*   < > 1.89*   ANIONGAP 13 10 9   < > 8   < > 10   VIRGIE 8.3* 8.4* 8.2*   < > 8.1*   < > 8.2*   GLC 93 102 90   < > 90   < > 261*    < > = values in this interval not displayed.       No results found for this or any previous visit (from the past 24 hour(s)).

## 2019-12-16 NOTE — PROGRESS NOTES
"0301/0301-01  Tika Au 86 year old female   Admission date:12/11/2019   Residence:   Code status: DNR/DNI   Isolation:No active isolations   Principal Problem:Hypernatremia   Post Op Day #: NA  Peds:No  Broselow: NA  Diet: thickened liquids  Mobility Status: A2, turn repo q 2 hrs.   Discharge timeline & plan: unsure of discharge date and/or discharge needs at this time.  Vital signs:  Temp: 97.4  F (36.3  C) Temp src: Tympanic BP: 115/79 Pulse: 59 Heart Rate: 56 Resp: 16 SpO2: 97 % O2 Device: None (Room air) Oxygen Delivery: 2 LPM(Titrated to 1L) Height: 157.5 cm (5' 2\") Weight: 61.3 kg (135 lb 2.3 oz)  Estimated body mass index is 24.72 kg/m  as calculated from the following:    Height as of this encounter: 1.575 m (5' 2\").    Weight as of this encounter: 61.3 kg (135 lb 2.3 oz).  Abnormal Physical Assessment:  Buttock reddened, groin reddened, labia reddened. Barrier applied. Turn repo q 2 hrs. Scattered bruising.   Last Pain/PRN Medication given:NA  Finger stick POCT blood sugars:NA  Labs:   Your basic metabolic panel results:  Lab Results   Component Value Date     12/15/2019       (Sodium/Salt)  Lab Results   Component Value Date    POTASSIUM 3.6 12/15/2019     Lab Results   Component Value Date     12/15/2019       (Glucose/Blood Sugar/Diabetic Screen)  Lab Results   Component Value Date    VIRGIE 8.1 12/15/2019       (Calcium)  Lab Results   Component Value Date    CR 0.94 12/15/2019       (Kidney Function)     Telemetry: No  Pending tests/procedures planned:NA  Comments:      SAFETY CHECKLIST  Arm Bands/Risk clasps correct and in place (DNR, Fall risk, Allergy, Latex, Limb):  Yes  IVF and rate ordered correct: Yes  Physical assessment verification(IV site, wounds, dressing intact, incisions, LDA's, neuro, CIWA...): Yes  Environmental assessment (bed/chair alarm on, call light, side rails, restraints, sitter....): Yes  Whiteboard updated by oncoming RN:Yes            "

## 2019-12-16 NOTE — PROGRESS NOTES
Bedside Handoff complete, safety checks verified by writer and are correct.    Report from Kyung

## 2019-12-16 NOTE — TELEPHONE ENCOUNTER
The patient has an appointment December 26 th with Dr Corrigan. I called [facility] and they stated that appointment would be fine.  Lisa Garcia LPN..................12/16/2019   9:39 AM

## 2019-12-16 NOTE — PROGRESS NOTES
"0301/0301-01  Tika Au 86 year old female   Admission date:12/11/2019   Residence: [facility]  Code status: DNR/DNI   Isolation:No active isolations   Principal Problem:Hypernatremia   Diet: NDDL 1, nectar  Mobility Status: bedrest  Discharge timeline & plan: progressing to discharge. No additional resources anticipated.  Vital signs:  Temp: 98  F (36.7  C) Temp src: Tympanic BP: 133/58 Pulse: 59 Heart Rate: 56 Resp: 16 SpO2: 97 % O2 Device: None (Room air) Oxygen Delivery: 2 LPM(Titrated to 1L) Height: 157.5 cm (5' 2\") Weight: 61.3 kg (135 lb 2.3 oz)  Estimated body mass index is 24.72 kg/m  as calculated from the following:    Height as of this encounter: 1.575 m (5' 2\").    Weight as of this encounter: 61.3 kg (135 lb 2.3 oz).  Abnormal Physical Assessment:labia sores, coccyx stage 1 pressure sore, disoriented x4.         SAFETY CHECKLIST  Arm Bands/Risk clasps correct and in place (DNR, Fall risk, Allergy, Latex, Limb):  Yes  IVF and rate ordered correct: Yes  Physical assessment verification(IV site, wounds, dressing intact, incisions, LDA's, neuro, CIWA...): Yes  Environmental assessment (bed/chair alarm on, call light, side rails, restraints, sitter....): Yes  Whiteboard updated by oncoming RN:Yes    Nelsy Ivey RN ,.................................... 12/16/19, 4:07 AM              "

## 2019-12-16 NOTE — PROGRESS NOTES
"0301/0301-01  Tika Au 86 year old female   Admission date:12/11/2019   Residence: [facility]   Code status: DNR/DNI   Isolation:No active isolations   Principal Problem:Hypernatremia   Post Op Day #: NA  Peds: n/a  Broselow: n/a  Diet: mechanical soft  Mobility Status: turn/repo q2h. W/c bound at baseline   Discharge timeline & plan: progressing to discharge. No additional resources anticipated. Will discharge tomorrow as long as Na is still appropriately low.     Potassium came up to 4.2.     Vital signs:  Temp: 97  F (36.1  C) Temp src: Tympanic BP: 116/59 Pulse: 57   Resp: 16 SpO2: 93 % O2 Device: None (Room air) Oxygen Delivery: 2 LPM(Titrated to 1L) Height: 157.5 cm (5' 2\") Weight: 61.3 kg (135 lb 2.3 oz)  Estimated body mass index is 24.72 kg/m  as calculated from the following:    Height as of this encounter: 1.575 m (5' 2\").    Weight as of this encounter: 61.3 kg (135 lb 2.3 oz).  Abnormal Physical Assessment: dementia patient, disoriented at baseline. Aspiration precautions. Has stage I pressure ulcer on coccyx.  Catheter in place for urinary retention frequent loose stools with negative c-diff.   Last Pain/PRN Medication given: denies pain.   Finger stick POCT blood sugars: n/a   Labs:   Your basic metabolic panel results:  Lab Results   Component Value Date     12/16/2019       (Sodium/Salt)  Lab Results   Component Value Date    POTASSIUM 3.2 12/16/2019     Lab Results   Component Value Date     12/16/2019       (Glucose/Blood Sugar/Diabetic Screen)  Lab Results   Component Value Date    VIRGIE 7.8 12/16/2019       (Calcium)  Lab Results   Component Value Date    CR 1.02 12/16/2019       (Kidney Function)   Telemetry: No  Pending tests/procedures planned: anticipating potassium recheck around 1730.   Comments: replaced potassium.     Patient is awake and alert, disoriented x4. She has brownish color \"drool\" per nursing assistant. I did visualize it, it does not appear " coffee-ground or bloody. She has stable VS. Clear LS.   Will continue to monitor.     SAFETY CHECKLIST  Arm Bands/Risk clasps correct and in place (DNR, Fall risk, Allergy, Latex, Limb):  Yes  IVF and rate ordered correct: Yes  Physical assessment verification(IV site, wounds, dressing intact, incisions, LDA's, neuro, CIWA...): Yes  Environmental assessment (bed/chair alarm on, call light, side rails, restraints, sitter....): Yes  Whiteboard updated by oncoming RN:Yes          Jennifer Elliott RN on 12/16/2019 at 12:58 PM      Bedside Handoff complete, safety checks verified by writer and are correct.    Nelsy Ivey RN ,.................................... 12/16/19, 7:18 PM

## 2019-12-16 NOTE — PROGRESS NOTES
:    Called Jennifer at [facility] and provided discharge update. Anticipated discharge tomorrow.  will continue to follow.     [facility] on 12/16/2019 at 1:26 PM

## 2019-12-17 NOTE — PROGRESS NOTES
:    Called Jennifer at [facility] and left voicemail with update that patient is not anticipated to discharge today.  will continue to follow.     [facility]U [facility] on 12/17/2019 at 9:14 AM    Addendum:    Called Jennifer at  and provided discharge update. Anticipated discharge tomorrow.  will continue to follow.     [facility]U [facility] on 12/17/2019 at 1:23 PM

## 2019-12-17 NOTE — PLAN OF CARE
"Pt has continuous drooling, dark brown, unable to drink, attempted to give HS Remeron and pill sat in mouth - partially disintegrated.  Ongoing diarrhea, 1 loose BM at 2030.    0045- Pt noted to have more dark brown drooling with a moderate coffee ground emesis. LS diminished. Abdomen soft and non-tender. BS rare to RLQ, LLQ, none heard to upper quadrants. Minimal urine output, Clamped Buck to obtain UA with no urine output. Buck patent with 40-70 mL noted on bladder scan. Bp elevated after IV start.    BP (!) 169/92 (BP Location: Right arm)   Pulse 66   Temp 97.6  F (36.4  C) (Tympanic)   Resp 12   Ht 1.575 m (5' 2\")   Wt 61.3 kg (135 lb 2.3 oz)   LMP  (LMP Unknown)   SpO2 94%   BMI 24.72 kg/m       MD notified to update pt has no urine output and possible aspiration after vomiting. PRN duoneb ordered. No other new orders at this time.       "

## 2019-12-17 NOTE — PROGRESS NOTES
[facility]    Hospitalist Progress Note      Assessment & Plan   Tika Au is a 86 year old female who was admitted on 12/11/2019.     Principal Problem:    Hypernatremia    Assessment: Resolved after half-normal saline with transition to D5W at 50 ml per hour.  Poor po intake yesterday with vomiting x1 last night and will need to restart fluids today for acute infection as below.     Plan: - restart ivf today   - bmp in am    Catheter associated uti  Assessment: Significant lethargy today, nausea overnight poor oral intake and significant bacteriuria with mild suprapubic pain with palpation as well as urinary retention with Buck replaced.  Plan: - Follow-up urine culture   - IV ceftriaxone day 1   - continue Buck    Jaw Dislocation  Assessment:  Resolved. Status post reduction although difficult with moderate sedation in the ER.   Plan: - avoid over extension of jaw              - dysphagia diet   - Aspiration precautions     Active Problems:    Alzheimer's disease (H)    Assessment: Moderate to severe, nonverbal and bed boud at this point.     Plan: - she remains DNR/DNI with management of easily reversible conditions.              - recent tsh wnl   - dysphagia diet and close monitoring when eating                  Hypertension    Assessment: stable and not actively treated    Plan: - monitor       ARF (acute renal failure) (H)    Assessment: Resolved and likely prerenal, failed a trial of void.    Plan: - monitor daily   - trial of void at [facility]     FEN: ndd1 with nectar thick liquids, ns at 100 ml/hr, mg/k replacement protocol  PPX: SCD's      Code Status: DNR/DNI    Jose Guadalupe Lick    Interval History   Overnight increasing lethargy with poor urine output, one episode of vomiting with taking pills, increasingly lethargic and drooling at times, otherwise nonverbal and unable to give any meaningful.    -Data reviewed today: I reviewed all new labs and imaging results over the last 24  hours. I personally reviewed no images or EKG's today.    Physical Exam   Temp: 97.8  F (36.6  C) Temp src: Tympanic BP: 130/56 Pulse: 63 Heart Rate: 57 Resp: 16 SpO2: 94 % O2 Device: Nasal cannula    Vitals:    12/14/19 0514 12/15/19 0318 12/17/19 0552   Weight: 62.4 kg (137 lb 9.1 oz) 61.3 kg (135 lb 2.3 oz) 59.5 kg (131 lb 2.8 oz)     Vital Signs with Ranges  Temp:  [97  F (36.1  C)-97.8  F (36.6  C)] 97.8  F (36.6  C)  Pulse:  [57-66] 63  Heart Rate:  [56-57] 57  Resp:  [12-16] 16  BP: (116-169)/(56-92) 130/56  SpO2:  [93 %-95 %] 94 %  I/O last 3 completed shifts:  In: 135 [P.O.:105; I.V.:30]  Out: 570 [Urine:570]    Exam:   GENERAL: Laying in bed, not tracking, in no apparent distress.  CARDIOVASCULAR: regular rate and rhythm, no murmurs, rubs, or gallops. Normal S1/S2. No lower extremity edema.   RESPIRATORY: clear to auscultation bilaterally, no wheezes, no crackles.   GI: soft, mild grimacing with deep suprapubic palpation, otherwise non-distended, normoactive bowel sounds.  Buck in place with scant amounts of concentrated yellow urine.    MUSCULOSKELETAL: warm and well perfused, 2+ dorsalis pedis pulses bilaterally.    SKIN: no pallor,jaundice, or rashes    Medications     sodium chloride 100 mL/hr at 12/17/19 0726       cefTRIAXone  1 g Intravenous Q24H     levothyroxine  50 mcg Oral Daily     sodium chloride (PF)  3 mL Intracatheter Q8H       Data   Recent Labs   Lab 12/17/19  0420 12/16/19  1745 12/16/19  0845 12/15/19  1422  12/14/19  0520  12/13/19  0424  12/12/19  0400   WBC  --   --   --   --   --  8.6  --  7.7  --  11.9*   HGB  --   --   --   --   --  12.7  --  12.0  --  13.7   MCV  --   --   --   --   --  96  --  101*  --  99   PLT  --   --   --   --   --  111*  --  87*  --  145*     --  138 141   < > 150*   < > 161*   < > 166*   POTASSIUM 4.2 4.2 3.2* 3.6   < > 4.0   < > 4.4   < > 3.8   CHLORIDE 106  --  109* 112*   < > 122*   < > 131*   < > 132*   CO2 24  --  21 22   < > 19*   < > 22   < >  24   BUN 16  --  14 17   < > 28*   < > 44*   < > 72*   CR 1.20  --  1.02 0.94   < > 1.06   < > 1.42*   < > 1.89*   ANIONGAP 12  --  8 7   < > 9   < > 8   < > 10   VIRGIE 8.9  --  7.8* 8.1*   < > 8.2*   < > 8.1*   < > 8.2*   *  --  165* 201*   < > 90   < > 90   < > 261*    < > = values in this interval not displayed.       No results found for this or any previous visit (from the past 24 hour(s)).

## 2019-12-17 NOTE — PROGRESS NOTES
[facility]    Hospitalist Progress Note      Assessment & Plan   Tika Au is a 86 year old female who was admitted on 12/11/2019.     Principal Problem:    Hypernatremia    Assessment: Resolved after half-normal saline with transition to D5W at 50 mils per hour.      Plan: -Monitor for another 24 hours with BMP in a.m. to ensure stability    Jaw Dislocation  Assessment:  Resolved. Status post reduction although difficult with moderate sedation in the ER.   Plan: - avoid over extension of jaw              - dysphagia diet   -Aspiration precautions     Active Problems:    Alzheimer's disease (H)    Assessment: Moderate to severe, no longer mobile but apparently recognizes her children and enjoys listening to music.    Plan: - Further life-sustaining efforts pending clinical course               - she remains DNR/DNI with management of easily reversible conditions.              - recent tsh wnl   - started on remeron qhs                  Hypertension    Assessment: stable and not actively treated    Plan: - monitor       ARF (acute renal failure) (H)    Assessment: Resolved and likely prerenal, failed a trial of void.    Plan: - monitor daily              - repeat ua/ucx   - tov at [facility]     FEN: ndd1 with nectar thick liquids, mg/k replacement protocol  PPX: SCD's      Code Status: DNR/DNI    Jose Guadalupe Lick    Interval History   Overnight no acute events and afebrile, she is unable to give any meaningful information and remains nonverbal.    -Data reviewed today: I reviewed all new labs and imaging results over the last 24 hours. I personally reviewed no images or EKG's today.    Physical Exam   Temp: 97  F (36.1  C) Temp src: Tympanic BP: 116/59 Pulse: 57   Resp: 16 SpO2: 93 % O2 Device: None (Room air)    Vitals:    12/13/19 0500 12/14/19 0514 12/15/19 0318   Weight: 58.6 kg (129 lb 3 oz) 62.4 kg (137 lb 9.1 oz) 61.3 kg (135 lb 2.3 oz)     Vital Signs with Ranges  Temp:  [97  F (36.1   C)-97.8  F (36.6  C)] 97  F (36.1  C)  Pulse:  [55-57] 57  Resp:  [16-17] 16  BP: ()/(59-70) 116/59  SpO2:  [93 %-94 %] 93 %  I/O last 3 completed shifts:  In: 1678 [P.O.:220; I.V.:1458]  Out: 700 [Urine:700]    Exam:   GENERAL: Eating up in bed, not tracking, in no apparent distress.  CARDIOVASCULAR: regular rate and rhythm, no murmurs, rubs, or gallops. Normal S1/S2. No lower extremity edema.   RESPIRATORY: clear to auscultation bilaterally, no wheezes, no crackles.   GI: soft, non-tender, non-distended, normoactive bowel sounds.  Buck in place with clear yellow urine.  MUSCULOSKELETAL: warm and well perfused, 2+ dorsalis pedis pulses bilaterally.    SKIN: no pallor,jaundice, or rashes    Medications       levothyroxine  50 mcg Oral Daily     mirtazapine  7.5 mg Oral At Bedtime     sodium chloride (PF)  3 mL Intracatheter Q8H       Data   Recent Labs   Lab 12/16/19  1745 12/16/19  0845 12/15/19  1422 12/15/19  0520  12/14/19  0520  12/13/19  0424  12/12/19  0400   WBC  --   --   --   --   --  8.6  --  7.7  --  11.9*   HGB  --   --   --   --   --  12.7  --  12.0  --  13.7   MCV  --   --   --   --   --  96  --  101*  --  99   PLT  --   --   --   --   --  111*  --  87*  --  145*   NA  --  138 141 149*   < > 150*   < > 161*   < > 166*   POTASSIUM 4.2 3.2* 3.6 4.0   < > 4.0   < > 4.4   < > 3.8   CHLORIDE  --  109* 112* 116*   < > 122*   < > 131*   < > 132*   CO2  --  21 22 20*   < > 19*   < > 22   < > 24   BUN  --  14 17 20   < > 28*   < > 44*   < > 72*   CR  --  1.02 0.94 1.00   < > 1.06   < > 1.42*   < > 1.89*   ANIONGAP  --  8 7 13   < > 9   < > 8   < > 10   VIRGIE  --  7.8* 8.1* 8.3*   < > 8.2*   < > 8.1*   < > 8.2*   GLC  --  165* 201* 93   < > 90   < > 90   < > 261*    < > = values in this interval not displayed.       No results found for this or any previous visit (from the past 24 hour(s)).

## 2019-12-17 NOTE — PROGRESS NOTES
:    Received report from RN that patient's son, Sd ([phone]) would like to be called with updates. According to Sd, he is patient's Health Care Agent, although HCD is not scanned into patient's chart. Sd reports that patient's other son, Jemal should not be contacted as he has Wernicke's and is unable to make decisions. Apparently, Sd is not happy with the care patient is receiving at [facility] and may not want patient to return there at discharge.     Called Jennifer at [facility]. Jennifer reports that they have health care directive stating that Sd is Health Care Agent. Asked Jennifer to provide HCD to  so [facility] can have it on file.     Called patient's son Sd. Sd reports that he is upset with the care patient has been receiving at [facility] recently, most notably that he was not contacted about his mother's jaw being locked open. He wants to make sure that patient is medically ready to discharge before leaving the hospital. Provided Sd with number for josef[facility] and encouraged him to call if he has a complaint about [facility]. Also informed him that this writer would express his concerns to [facility]. Discussed discharge plans. Sd lives in [place], but may come up to [place] to work on moving patient to another facility. Sd is unsure if he even wants patient to return to  from the hospital. Offered to call local memory care facilities and make referrals where there may be openings. Sd agreed with this plan.     Called Jennifer at [facility] and left voicemail re: Sd's concerns about patient's cares at . Also notified her that referrals will be made to other facilities.      will continue to follow.     VU [facility] on 12/17/2019 at 3:20 PM    Addendum:    Called Jil at [facility]. She reports that they may have an opening and would review referral. Referral faxed.     Called Shira at [facility]. Shira reports that  they have one opening in their memory care that may be filled tomorrow. She is willing to look at a referral in the event that they do have an opening. She also anticipated additional openings in January and reports that patient's son Sd can contact her. Referral faxed to [facility].     [facility] Per Ethel, their facility currently has openings and will screen referral. Referral faxed.     Called Agata at [facility]. Per Agata, they currently have openings. A shared room for a patient on Elderly Waiver and a private room that is private pay. They will screen referral. Referral faxed.     Called Jazz from [facility]. She reports that they have openings and an RN can screen referral tomorrow. Referral faxed.     Called Nereyda at [facility] and left voicemail re: referral. Referral faxed.     Called Leticia at [facility]. Leticia believes there may be openings, but that DAYSI Vallejo will need to screen referral tomorrow. Referral faxed.     Called Kylha at [facility]. They do not have any openings at this time.      to follow up on referrals to facilities and follow up with patient's son Sd tomorrow.     VU [facility] on 12/17/2019 at 4:08 PM

## 2019-12-18 NOTE — PLAN OF CARE
Discharge Planner SLP   Patient plan for discharge: Memory care unit.     Current status: Pt met in room for dysphagia evaluation. Pt presented with inconsistent/inappropriate affect throughout the session. She demonstrated difficulty following commands and participating in evaluation. She accepted trials with repeat presentation and verbal cues.. Pt presents with severe dysphagia. She consistently demonstrated absent swallow with all trials of thin water and ice chips. Pt was trialed with verbal, visual, and tactile cues; however, pt did not trigger a swallow response. Pt demonstrated inconsistent munching and rotary mastication throughout the assessment. At this time, pt is not safe for PO intake. She is at significant risk for aspiration and pneumonia. However, if pt to continue PO intake, please follow recommendations below.    Pt was provided oral care with toothbrush; however no toothpaste was used due to difficulty following directions. Pt benefited from verbal cues to discontinue biting toothbrush and swabs.     Barriers to return to prior living situation: None at this time.    Recommendations for discharge: Thin water okay post oral cares. Unable to assess for benefit from thickened liquids at this time, order left for nectar thick. NDD1 textures. Only provide PO intake when pt accepts. Observe laryngeal excursion/movement for each swallow prior to administering additional bites/sips, discontinue PO intake if pt is not triggering a swallow, encourage small sips of water throughout the day after performing oral cares, complete oral cares in morning and night, complete oral cares prior to and following any PO intake, and sit pt upright at 90 degrees for oral cares and PO intake.     Rationale for recommendations: Outcomes of clinical bedside evaluation on 12/18/2019.       Entered by: Anabel Lutz 12/18/2019 4:20 PM

## 2019-12-18 NOTE — PROGRESS NOTES
:     MD spoke with patient's son Sd regarding care planning.  Sd will contact his brother and call  later to schedule a conference call with MD.    Spoke with Jennifer at [facility] and provided with a discharge update.     Received a call from [facility].  They have 1 semi private room open.  Patient would need Elderly Waiver Program.    Received a call from Ethel at [facility].  They did not receive the written referral information.   Re-faxed.      JOSE Berg on 12/18/2019 at 11:02 AM    Addendum:    Received return call from Sd.  He would like to have the conference call with MD at 0930 tomorrow.  Discussed discharge planning.  Explained that patient would need to apply for Elderly Waiver if they wanted to look at assisted living options as patient does not have a pay source.  He inquired about [facility] and explained that patient would need to be on hospice and private pay approximately $150.00 per day.  Discussed alternative local SNF options.  Son was very interested in the [facility].      Follow up call to the [facility].  Left a message inquiring if they received fax and reviewed referral.    Requested a call back.      Received a return call from Jil at the [facility].  They did not receive the referral.  Refaxed.    JOSE Berg on 12/18/2019 at 1:36 PM    Addendum:    Spoke with MD.  Conference call scheduled for 0930.  Sd will call Dr. Abel at scheduled time.    JOSE Berg on 12/18/2019 at 2:39 PM

## 2019-12-18 NOTE — PLAN OF CARE
Patient has continued with NS, sodium level today at 142.  Patient has not eaten much today with aid attempting to assist with feeding.  Patient will put food in her mouth and at times not even attempt to swallow.  Patient has oral secretion drooling as well.  VSS, afebrile.  See SW note about sonSd.  No new skin breakdown this shift, turned and repositioned every 2 hours or as needed.  Continues to have lower urine output with 150 out via hammond this shift.  MD is aware.

## 2019-12-18 NOTE — PLAN OF CARE
Pt is Afebrile. Sleepy but responds to verbal stim and gentle touch. Unable to administer oral medications due to inability to swallow - Dr. Abel aware. Speech orders placed to assess. VSS. Afebrile. Fluids infusing in right IV. Repo Q2hrs. LS crackle/dim - unable to deep breath. Does not follow commands.

## 2019-12-18 NOTE — PROGRESS NOTES
[facility]    Hospitalist Progress Note      Assessment & Plan   Tika Au is a 86 year old female who was admitted on 12/11/2019.     Principal Problem:    Hypernatremia    Assessment: Resolved after half-normal saline with transition to D5W at 50 ml per hour.  Poor po intake again yesterday and fluids resumed.  No further vomiting x1    Plan:  - bmp daily and again try to discontinue ivf if able to advance diet safely    Catheter associated uti  Assessment: improved mentation today, no further nausea or vomiting,  Again with poor oral intake and significant bacteriuria as well as urinary retention with Buck replaced.  Plan: - Follow-up urine culture   - IV ceftriaxone day 2   - continue Buck    Jaw Dislocation  Assessment:  Resolved. Status post reduction although difficult with moderate sedation in the ER.   Plan: - avoid over extension of jaw              - dysphagia diet   - Aspiration precautions    Active Problems:    Alzheimer's disease (H)    Assessment: Moderate to severe, nonverbal at baseline, bed boud at this point with significant dysphagia.  Discussed goals of care at length with son today explaining her clinical course as well as further goals of care.  At this point he wants to continue with trying to advance diet, evaluating her sodium level tomorrow, treating any possible new infections and then having a care conference with his other brother by phone tomorrow to decide on further short and medium term goals of care and logistics.    Plan: - she remains DNR/DNI with management of easily reversible conditions.              - recent tsh wnl   - dysphagia diet and close monitoring when eating                  Hypertension    Assessment: stable and not actively treated    Plan: - monitor     FEN: ndd1 with nectar thick liquids, ns at 100 ml/hr, mg/k replacement protocol  PPX: SCD's      Code Status: DNR/DNI    Jose Guadalupe Abel    Interval History   Overnight no acute events  and afebrile, mental status has improved significantly, difficult with tolerating food but much more alert, nonverbal and unable to give any meaningful information.    -Data reviewed today: I reviewed all new labs and imaging results over the last 24 hours. I personally reviewed no images or EKG's today.    Physical Exam   Temp: 97.3  F (36.3  C) Temp src: Tympanic BP: 136/56 Pulse: 57 Heart Rate: 60 Resp: 20 SpO2: 93 % O2 Device: None (Room air) Oxygen Delivery: 1 LPM  Vitals:    12/15/19 0318 12/17/19 0552 12/18/19 0600   Weight: 61.3 kg (135 lb 2.3 oz) 59.5 kg (131 lb 2.8 oz) 61.8 kg (136 lb 3.9 oz)     Vital Signs with Ranges  Temp:  [97.3  F (36.3  C)-98.1  F (36.7  C)] 97.3  F (36.3  C)  Pulse:  [55-59] 57  Heart Rate:  [60] 60  Resp:  [16-20] 20  BP: (126-142)/(56-61) 136/56  SpO2:  [91 %-95 %] 93 %  I/O last 3 completed shifts:  In: 120 [P.O.:120]  Out: 375 [Urine:375]    Exam:   GENERAL: Laying in bed, not tracking, in no apparent distress.  CARDIOVASCULAR: regular rate and rhythm, no murmurs, rubs, or gallops. Normal S1/S2. No lower extremity edema.   RESPIRATORY: coarse productive cough, no no wheezes, no crackles.   GI: soft, no further grimacing with suprapubic palpation, otherwise non-distended, normoactive bowel sounds.  Buck in place with yellow urine.    MUSCULOSKELETAL: warm and well perfused, 2+ dorsalis pedis pulses bilaterally.    SKIN: no pallor,jaundice, or rashes    Medications       cefTRIAXone  1 g Intravenous Q24H     levothyroxine  50 mcg Oral Daily     sodium chloride (PF)  3 mL Intracatheter Q8H       Data   Recent Labs   Lab 12/18/19  0412 12/17/19  0420 12/16/19  1745 12/16/19  0845  12/14/19  0520  12/13/19  0424   WBC 11.2*  --   --   --   --  8.6  --  7.7   HGB 11.5*  --   --   --   --  12.7  --  12.0   MCV 92  --   --   --   --  96  --  101*     --   --   --   --  111*  --  87*    142  --  138   < > 150*   < > 161*   POTASSIUM 3.8 4.2 4.2 3.2*   < > 4.0   < > 4.4    CHLORIDE 114* 106  --  109*   < > 122*   < > 131*   CO2 20* 24  --  21   < > 19*   < > 22   BUN 14 16  --  14   < > 28*   < > 44*   CR 0.87 1.20  --  1.02   < > 1.06   < > 1.42*   ANIONGAP 8 12  --  8   < > 9   < > 8   VIRGIE 7.8* 8.9  --  7.8*   < > 8.2*   < > 8.1*    128*  --  165*   < > 90   < > 90    < > = values in this interval not displayed.       No results found for this or any previous visit (from the past 24 hour(s)).

## 2019-12-18 NOTE — PLAN OF CARE
"/61 (BP Location: Right arm)   Pulse 55   Temp 97.8  F (36.6  C) (Tympanic)   Resp 20   Ht 1.575 m (5' 2\")   Wt 59.5 kg (131 lb 2.8 oz)   LMP  (LMP Unknown)   SpO2 95%   BMI 23.99 kg/m      Pt VS and resting comfortably throughout shift.  Will continue to monitor.  Jennifer Maria RN.............................12/18/2019 3:50 AM      SAFETY CHECKLIST  ID Bands and Risk clasps correct and in place (DNR, Fall risk, Allergy, Latex, Limb):  Yes  All Lines Reconciled and labeled correctly: Yes  Whiteboard updated:Yes  Environmental interventions (bed/chair alarm on, call light, side rails, restraints, sitter....): Yes  Report from Jennifer  Bedside Handoff complete, safety checks verified by writer and are correct.    Arnold Cortez RN on 12/18/2019 at 7:13 AM        "

## 2019-12-18 NOTE — PROGRESS NOTES
"Clinical Nutrition / Initial Assessment     Reason for Assessment:  Length of stay    Assessment:   Client History:  Pt with dementia, was resting this afternoon. Spoke with staff, has had poor oral intakes, often only bites. Will try thickened supplements to see if she will tolerate.   Diet Order:  NDD I, nectar thick fluids  Oral Intake:  Minimal  Supplement Intake:  Will add thickened supplements to trays BID  Weight:   Wt Readings from Last 10 Encounters:   12/18/19 61.8 kg (136 lb 3.9 oz)   09/11/19 64.9 kg (143 lb)   08/12/19 65.2 kg (143 lb 12.8 oz)   07/09/15 73.5 kg (162 lb)   05/13/15 73.2 kg (161 lb 6.4 oz)   02/26/15 72.6 kg (160 lb)   03/31/14 70.2 kg (154 lb 12.8 oz)   06/17/13 71.7 kg (158 lb)   03/19/13 73.9 kg (163 lb)   Height: 5' 2\"  BMI: Body mass index is 24.92 kg/m .    Estimated nutritional needs based on:  current body weight  62 kg / 136 lbs   Estimated energy needs:  1629-5046 kcal/day (25-30 kcal/kg)  Estimated protein needs:  62-74 gm/day (1-1.2 g/kg)  Estimated fluid needs:  0234-8820 ml/day (1 ml/kcal)    Malnutrition Criteria:  (Need to have 2 indicators to qualify recommendation)  Energy Intake:  Acute Severe: </= 50% of estimated energy requirement for >/= 5 days  Interpretation of Weight Loss:  No significant weight loss  Physical Findings:  Chronic Muscle Mass Loss:  mild  Reduced  Strength:  most likely reduced    Recommended Nutrition Diagnosis:   Severe Malnutrition in the context of acute illness or injury - based on AND/ASPEN Clinical Characterstics of Malnutrition May 2012      Nutrition Education: Nutrition education will be provided as appropriate.    Nutrition Diagnosis: Oral or Nutrition Support Intake:  inadequate energy intakes related to decreased appetite as evidenced by intakes only bites at meals.    Intervention:  Nutrition Prescription: dysphagia diet    Nutrition Intervention(s): Recommended modified diet: NDD I, nectar thick fluids  1. Meals and Snacks: " dysphagia diet  2. Medical Food Supplement: will add BID, thickened    Nutrition Goal(s):  1. Will increase intakes to 50% at most meals and supplements   2. Will not have unplanned wt loss during hospitalization  3. Will allow staff to assist at meals    Monitoring and Evaluation:   Food Intake   Fluid intakes  Supplement intakes  Diet tolerance  Weight     Discharge Recommendation:   Nutrition Discharge Planning  diet texture per speech therapy recommendations. At this time, recommend supplements to maintain wt and nutritional status.     RD will reassess in within 1-5 days or sooner.    Lisa Colmenares RD on 12/18/2019 at 3:23 PM

## 2019-12-18 NOTE — PROGRESS NOTES
"Pt is disoriented x 4 - responds to her name but does not follow commands. Buck in with low output about 30ml/hr. Dr. Abel is aware. No sign/symptoms of pain - intermittently smiles. BP done last on right calf as L-arm did not give bp. Pt is unable to swallow - held AM oral medications due to this - Dr. Abel aware. LS crackle/dim. BS hypoactive with lower quads non audible. Has not had a BM today during this shift. Sat at 90s on RA.     BP (!) 156/67 (BP Location: Right leg)   Pulse 53   Temp 98  F (36.7  C) (Tympanic)   Resp 20   Ht 1.575 m (5' 2\")   Wt 61.8 kg (136 lb 3.9 oz)   LMP  (LMP Unknown)   SpO2 95%   BMI 24.92 kg/m       "

## 2019-12-19 NOTE — PROGRESS NOTES
:     Follow up call with Jil from the [facility].  They are still screening patient.  Awaiting decision.    JOSE Berg on 12/19/2019 at 11:25 AM

## 2019-12-19 NOTE — PLAN OF CARE
Pt more alert than previous assessment, remains disorientated x 4, afebrile, HR remains kristina 52 and irregular. Speech is garbled. Lung sounds diminished, O2 97% on RA. Bowel sounds active. Pt pockets food, will not swallow, aspiration precaution remain in place. HOB @ 30 degrees and suction equipment at bedside, will continue to monitor. Virginia Rubio RN on 12/19/2019 at 4:28 PM

## 2019-12-19 NOTE — PROGRESS NOTES
SAFETY CHECKLIST  ID Bands and Risk clasps correct and in place (DNR, Fall risk, Allergy, Latex, Limb):  Yes  All Lines Reconciled and labeled correctly: Yes  Whiteboard updated:Yes  Environmental interventions (bed/chair alarm on, call light, side rails, restraints, sitter....): Yes  Virginia Rubio RN on 12/19/2019 at 7:03 AM

## 2019-12-19 NOTE — PLAN OF CARE
Discharge Planner SLP   Patient plan for discharge: TBD   Current status: Patient seen at bedside for assessment of swallow function. Patient continues to have difficulty initiating swallow. Patient presented with ice chips. Patient demonstrating munch pattern mastication and effortful swallow on 2/3 attempts. Final attempt, patient loosing bolus anteriorly. Patient presented with nectar thick liquids via spoon. Patient taking liquid off spoon with max cues. Significantly delayed swallow, patient refusing further trails of nectar thick liquids. Patient presented with applesauce via spoon, patient needing max cues to remove bolus from spoon. Patient unable to trigger swallow and required SLP and RN to remove bolus from oral cavity. Delayed cough noted following oral care.   Barriers to return to prior living situation: Medical Status   Recommendations for discharge: Patient to continue ST to determine if patient is able to tolerate PO intake.   Rationale for recommendations: Results of PO trials 12/19/2019       Entered by: aJckie Jackson 12/19/2019 12:36 PM

## 2019-12-19 NOTE — PROGRESS NOTES
"Patient noted to be resting comfortably in bed. Disoriented x 4. Check, change and repositioned every two hours. Lung sounds very diminished throughout all the bases. Heart rate is regular, but noted to be bradycardic. Bowel sounds are hypoactive. Offered sips of fluids and meals and patient unwilling to take. No indicators of pain noted. IVF's running at 75 mL's per hour per order. Skin noted to be intact, other than buttocks that has an Allevyn dressing clean, dry and intact. Still obtunded, will arouse to gentle shaking, but unable to answer questions appropriately. Incomprehensible sounds and words that are garbled. Still has a frequent cough that is non-productive.     Vitals stable, /68 (BP Location: Left arm)   Pulse 53   Temp 98.8  F (37.1  C) (Tympanic)   Resp 18   Ht 1.575 m (5' 2\")   Wt 61.8 kg (136 lb 3.9 oz)   LMP  (LMP Unknown)   SpO2 95%   BMI 24.92 kg/m      Iron Valenzuela RN 12/18/19 11:00 PM        "

## 2019-12-19 NOTE — PROGRESS NOTES
:    Follow up coordination with Jil zav[facility].  Inquired if they had made a decision if they can accept patient.  They are still screening and want to assure they can meet patient's needs.     Received a call from Jennifer in Admissions at [facility] inquiring if patient will be returning at discharge.  Updated that patient's family had a goals of care conference call this morning and had not made a decision.  [facility] awaiting decision.    Anticipated discharge to SNF tomorrow.  Awaiting decision from patient's family regarding discharge plans.      JOSE Berg on 12/19/2019 at 2:46 PM

## 2019-12-19 NOTE — PROGRESS NOTES
12/18/19 1700   General Information   Onset Date 12/11/19   Start of Care Date 12/18/19   Referring Physician Dr. Abel   Patient Profile Review/OT: Additional Occupational Profile Info See Profile for full history and prior level of function   Patient/Family Goals Statement Family goals to safely upgrade diet as appropriate.    Swallowing Evaluation Bedside swallow evaluation   Behaviorial Observations Confused   Mode of current nutrition Oral diet   Type of oral diet Dysphagia diet level 1;Nectar - thick liquid   Respiratory Status Room air   Clinical Swallow Evaluation   Oral Musculature unable to assess due to poor participation/comprehension   Structural Abnormalities present  (Jaw dislocation)   Dentition present and adequate   Secretion Management problems swallowing secretions   Mucosal Quality adequate   Mandibular Strength and Mobility   (unable to assess due to difficulty following directions)   Oral Labial Strength and Mobility impaired seal;other (see comments)  (Unable to assess. see above.)   Lingual Strength and Mobility other (see comments)  (unable to assess. )   Velar Elevation other (see comments)  (unable to assess. )   Buccal Strength and Mobility other (see comments)  (unable to assess)   Laryngeal Function Cough   Additional Documentation Yes   Swallow Eval   Feeding Assistance dependent   Clinical Swallow Eval: Thin Liquid Texture Trial   Mode of Presentation, Thin Liquids spoon;fed by clinician   Volume of Liquid or Food Presented .5 oz   Oral Phase of Swallow Residue in oral cavity;Poor AP movement   Oral Residue other (see comments);right anterior lateral sulci;left anterior lateral sulci;right lip drooling;left lip drooling  (Anterior loss of bolus and mid sulci residue)   Pharyngeal Phase of Swallow impaired;other (see comments)  (No pharyngeal swallow initiated)   Diagnostic Statement Severe dysphagia. No pharyngeal swallow initiated. Anterior loss bolus. Significant risk for  aspiration and unsafe for PO intake at this time.    Clinical Swallow Eval: Nectar Thick Liquid Texture Trial   Diagnostic Statement Severe dysphagia. Absent pharyngeal swallow with repeat max verbal, visual, and tactile cues. Pt demonstrated inconsisitent munching and rotary mastication. Pt at significant risk for aspiration and pneumonia with PO intake.    Swallow Compensations   Swallow Compensations Reduce amounts  (observe laryngeal movement before presenting more)   General Therapy Interventions   Planned Therapy Interventions Dysphagia Treatment   Dysphagia treatment Compensatory strategies for swallowing;Modified diet education  (target pharyngeal swallow initiation)   Swallow Eval: Clinical Impressions   Skilled Criteria for Therapy Intervention Skilled criteria met.  Treatment indicated.   Functional Assessment Scale (FAS) 1   Dysphagia Outcome Severity Scale (JER) Level 1 - JER   Treatment Diagnosis Severe Dysphagia   Diet texture recommendations NPO;Dysphagia diet level 1;Nectar thick liquids  (PO intake at discretion of MD and family-Unsafe at this time)   Recommended Feeding/Eating Techniques small sips/bites;check mouth frequently for oral residue/pocketing;other (see comments);maintain upright posture during/after eating for 30 mins  (observe swallow, oral cares pre-/post-PO intake)   Therapy Frequency Other (see comments)  (1-2x per week)   Predicted Duration of Therapy Intervention (days/wks) Duration of this hospital stay   Anticipated Discharge Disposition extended care facility   Risks and Benefits of Treatment have been explained. Yes   Patient, family and/or staff in agreement with Plan of Care Yes   Clinical Impression Comments Pt met in room for dysphagia evaluation. Pt presented with inconsistent/inappropriate affect throughout the session. She demonstrated difficulty following commands and participating in evaluation. She accepted trials with repeat presentation and verbal cues. Pt  presents with severe dysphagia. She consistently demonstrated absent swallow with all trials of thin water and ice chips. Pt was trialed with verbal, visual, and tactile cues; however, pt did not trigger a swallow response. Pt demonstrated inconsistent munching and rotary mastication throughout the assessment. At this time, pt is not safe for PO intake. She is at significant risk for aspiration and pneumonia. However, if pt to continue PO intake, please follow recommendations below. Pt was provided oral care with toothbrush; however no toothpaste was used due to difficulty following directions. Pt benefited from verbal cues to discontinue biting toothbrush and swabs. Thin water okay post oral cares. Unable to assess for benefit from thickened liquids at this time, order left for nectar thick. NDD1 textures. Only provide PO intake when pt accepts. Observe laryngeal excursion/movement for each swallow prior to administering additional bites/sips, discontinue PO intake if pt is not triggering a swallow, encourage small sips of water throughout the day after performing oral cares, complete oral cares in morning and night, complete oral cares prior to and following any PO intake, and sit pt upright at 90 degrees for oral cares and PO intake.   Total Evaluation Time   Total Evaluation Time (Minutes) 20

## 2019-12-19 NOTE — PROGRESS NOTES
SAFETY CHECKLIST  ID Bands and Risk clasps correct and in place (DNR, Fall risk, Allergy, Latex, Limb):  Yes  All Lines Reconciled and labeled correctly: Yes  Whiteboard updated:Yes  Environmental interventions (bed/chair alarm on, call light, side rails, restraints, sitter....): Yes    Iron Valenzuela RN 12/18/19 7:15 PM

## 2019-12-19 NOTE — PROGRESS NOTES
[facility]    Hospitalist Progress Note      Assessment & Plan   Tika Au is a 86 year old female who was admitted on 12/11/2019.     Principal Problem:    Hypernatremia    Assessment: Resolved after half-normal saline with transition to D5W.  Poor po intake again yesterday and fluids resumed.      Plan:  - bmp daily   - discontinue ivf today    Active Problems:    Alzheimer's disease (H)    Assessment: Moderate to severe, nonverbal at baseline, bed boud at this point with significant dysphagia.  Discussed goals of care at length with son Sd and Maco today with formal care conference. Summarized clinical course, treatments, and options regarding further goals of care. At this point they want to discuss transfer back to either [facility] or [facility] for memory care with consideration with hospice to start at time of discharge or after returning back to snf if she if showing evidence of ongoing decline.  She is to remain DNR/DNI with management of easily reversible conditions until they call back later today or tomorrow with a more formal decision.  Both sons very appreciative of care and for the discussion to better understand the clinical context. She will otherwise continue with dysphagia diet after discussion of risks and benefits of aspiration.     Catheter associated uti  Assessment: improved mentation today and nearing her baseline, no further nausea or vomiting, ucx positive for e.coli. A  Plan: - IV ceftriaxone day 3   - continue Buck with trial of void after tx of uti    Jaw Dislocation  Assessment:  Resolved. Status post reduction although difficult with moderate sedation in the ER.   Plan: - avoid over extension of jaw              - dysphagia diet   - Aspiration precautions       FEN: ndd1 with nectar thick liquids, discontinue ns, mg/k replacement protocol  PPX: SCD's      Code Status: DNR/DNI    Jose Guadalupe Abel    Interval History   Overnight no acute events and  afebrile, mental status has again improved, spontaneously alert today, interacting with SLP some, otherwise she remains nonverbal and she is unable to give any meaningful information.    -Data reviewed today: I reviewed all new labs and imaging results over the last 24 hours. I personally reviewed no images or EKG's today.    Physical Exam   Temp: 97.3  F (36.3  C) Temp src: Tympanic BP: 130/74 Pulse: 53 Heart Rate: 54 Resp: 20 SpO2: 93 % O2 Device: None (Room air)    Vitals:    12/15/19 0318 12/17/19 0552 12/18/19 0600   Weight: 61.3 kg (135 lb 2.3 oz) 59.5 kg (131 lb 2.8 oz) 61.8 kg (136 lb 3.9 oz)     Vital Signs with Ranges  Temp:  [97.3  F (36.3  C)-98.8  F (37.1  C)] 97.3  F (36.3  C)  Pulse:  [53] 53  Heart Rate:  [52-54] 54  Resp:  [18-20] 20  BP: (116-156)/(67-79) 130/74  SpO2:  [93 %-95 %] 93 %  I/O last 3 completed shifts:  In: 1021 [P.O.:40; I.V.:981]  Out: 830 [Urine:830]    Exam:   GENERAL: Laying in bed, not tracking, spontaneously awake, in no apparent distress.  CARDIOVASCULAR: regular rate and rhythm, no murmurs, rubs, or gallops. Normal S1/S2. No lower extremity edema.   RESPIRATORY: intermittent nonproductive cough and able to clear her airway, no wheezes, no crackles.   GI: soft, no further grimacing with suprapubic palpation, otherwise non-distended, normoactive bowel sounds.  Buck in place with yellow urine.    MUSCULOSKELETAL: warm and well perfused, 2+ dorsalis pedis pulses bilaterally.    SKIN: no pallor, jaundice, or rashes    Medications       cefTRIAXone  1 g Intravenous Q24H     levothyroxine  50 mcg Oral Daily     sodium chloride (PF)  3 mL Intracatheter Q8H       Data   Recent Labs   Lab 12/19/19  1240 12/19/19  0412 12/18/19  0412 12/17/19  0420  12/14/19  0520   WBC  --  9.2 11.2*  --   --  8.6   HGB  --  11.5* 11.5*  --   --  12.7   MCV  --  91 92  --   --  96   PLT  --  257 231  --   --  111*   NA  --  144 142 142   < > 150*   POTASSIUM 3.4* 3.3* 3.8 4.2   < > 4.0   CHLORIDE  --   110* 114* 106   < > 122*   CO2  --  25 20* 24   < > 19*   BUN  --  9 14 16   < > 28*   CR  --  0.90 0.87 1.20   < > 1.06   ANIONGAP  --  9 8 12   < > 9   VIRGIE  --  8.1* 7.8* 8.9   < > 8.2*   GLC  --  85 103 128*   < > 90    < > = values in this interval not displayed.       No results found for this or any previous visit (from the past 24 hour(s)).

## 2019-12-19 NOTE — PROGRESS NOTES
"No change from previous assessment.   Vitals stable, /72 (BP Location: Right arm)   Pulse 53   Temp 97.9  F (36.6  C) (Tympanic)   Resp 18   Ht 1.575 m (5' 2\")   Wt 61.8 kg (136 lb 3.9 oz)   LMP  (LMP Unknown)   SpO2 94%   BMI 24.92 kg/m      Will continue to monitor.  Iron Valenzuela RN 12/19/19 4:44 AM        "

## 2019-12-19 NOTE — DISCHARGE SUMMARY
[facility]    Discharge Summary  Hospitalist    Date of Admission:  12/11/2019  Date of Discharge:  12/20/2019  Discharging Provider: Jose Guadalupe Abel  Date of Service (when I saw the patient): 12/20/19    Discharge Diagnoses   Principal Problem:    Hypernatremia (12/11/2019)  Active Problems:    Alzheimer's disease (H) (10/26/2009)    Hypertension (5/13/2015)    ARF (acute renal failure) (H) (12/11/2019)    Jaw dislocation (12/11/2019)    History of Present Illness   Tika Au is an 86 year old female who presented with dislocated jaw and severe hypernatremia as well as acute renal failure.  Patient had suffered a spontaneous jaw dislocation at [facility] nursing facility on 11/12/2019.  At that time a trial of ibuprofen and baclofen as well as heat and massage to loosen the muscles was recommended.  She was apparently able to take in fluids and soft food as well as crushed medication.  Per report her oral intake had dramatically decreased and she was seen in the clinic on day of admit and then sent to the ER.  There her jaw was successfully reduced and she was admitted for further management.    Hospital Course   Tika Au was admitted on 12/11/2019.  The following problems were addressed during her hospitalization:      Alzheimer's disease (H): Moderate to severe, nonverbal at baseline, able to transfer to chair with significant assistance and with significant dysphagia which is consistent with her known baseline.  Discussed goals of care at length with son Sd and Maco chung prior to discharge with formal care conference. Summarized clinical course, treatments, and options regarding further goals of care. Both sons very appreciative of care and for the discussion to better understand the clinical context. She was able to tolerated dysphagia diet without difficulty on day of discharge, followed with SLP and will continue to, had robust urine output despite stopping ivf day prior to  discharge and she has returned to her mental status baseline.      Today we dad a follow up conference with son Sd on day of discharge and at this point they are willing to transfer back [facility], they want to continue with treating easily reversible conditions and consider rehospitalization if necessary, she is to remain DNR/DNI and they will come to visit in the coming days and make a decision on adding hospice cares and transitioning to comfort care if she continues to show evidence of ongoing decline due to progressive dementia.     Hypernatremia: Resolved after half-normal saline with transition to D5W.  Significantly improved po intake and stable urine output with treatment of uti as below.  Sodium remained stable after stopping d5w 5 days prior to discharge.      Catheter associated uti: Initial Hammond placement for urine output monitoring on admit due to renal dysfunction, she underwent a trial of void and failed, Hammond was replaced and she became significantly more lethargic.  UA and clinical context consistent with catheter associated UTI not present on admission.  She was initially started on IV ceftriaxone and urine culture returned positive for E. coli.  She will complete a full course of bactrim with hammond remove and void trial when bactrim is complete.      Jaw Dislocation: Resolved. Status post reduction although difficult with moderate sedation in the ER.  She will otherwise continue with a dysphasia diet and aspiration precautions. If this recurs she will need to be sent to ER with consider for ENT involvement.      Jose Guadalupe Abel MD    Significant Results and Procedures   none    Pending Results   These results will be followed up by NA  Unresulted Labs Ordered in the Past 30 Days of this Admission     No orders found from 11/11/2019 to 12/12/2019.          Code Status   DNR / DNI       Primary Care Physician   Physician No Ref-Primary    Physical Exam   Temp: 97.3  F (36.3  C) Temp src:  Tympanic BP: 130/74   Heart Rate: 54 Resp: 20 SpO2: 93 % O2 Device: None (Room air)    Vitals:    12/15/19 0318 12/17/19 0552 12/18/19 0600   Weight: 61.3 kg (135 lb 2.3 oz) 59.5 kg (131 lb 2.8 oz) 61.8 kg (136 lb 3.9 oz)     Vital Signs with Ranges  Temp:  [97.3  F (36.3  C)-98.8  F (37.1  C)] 97.3  F (36.3  C)  Heart Rate:  [52-54] 54  Resp:  [18-20] 20  BP: (116-153)/(68-79) 130/74  SpO2:  [93 %-95 %] 93 %  I/O last 3 completed shifts:  In: 1021 [P.O.:40; I.V.:981]  Out: 1280 [Urine:1280]    Exam on day of discharge:   GENERAL: laying in bed, spontaneously awake, in no apparent distress. Makes nonsensical verbal noises, not tracking.   HEENT: no pain with palpation to the bilateral TMJ's without swelling or overlying skin changes.   CARDIOVASCULAR: regular rate and rhythm, no murmurs, rubs, or gallops. Normal S1/S2. No lower extremity edema.   RESPIRATORY: clear to auscultation bilaterally, no wheezes, no crackles.   GI: soft, non-tender, non-distended, normoactive bowel sounds.  : hammond in place with yellow urine.   MUSCULOSKELETAL: warm and well perfused, 2+ dorsalis pedis pulses bilaterally.    SKIN: no pallor, jaundice, or rashes    Discharge Disposition   Discharged to nursing home  Condition at discharge: Stable    Consultations This Hospital Stay   SOCIAL WORK IP CONSULT  SPEECH LANGUAGE PATH ADULT IP CONSULT  NUTRITION SERVICES ADULT IP CONSULT    Time Spent on this Encounter   I, Jose Guadalupe Abel MD, personally saw the patient today and spent greater than 30 minutes discharging this patient.    Discharge Orders   No discharge procedures on file.  Discharge Medications   Current Discharge Medication List      CONTINUE these medications which have NOT CHANGED    Details   Dentifrices (BIOTENE DRY MOUTH) GEL One application after meals and at bedtime for dry mouth      levothyroxine (SYNTHROID/LEVOTHROID) 50 MCG tablet Take 50 mcg by mouth daily      NUTRITIONAL SUPPLEMENT LIQD House supplement TID between  meals for loss of appetite.      baclofen (LIORESAL) 10 MG tablet Take 0.5 tablets (5 mg) by mouth 2 times daily as needed for muscle spasms      bisacodyl (DULCOLAX) 10 MG suppository Place 10 mg rectally every 72 hours as needed for constipation      Elastic Bandages & Supports (MEDICAL COMPRESSION STOCKINGS) MISC MISCELLANEOUS MEDICAL SUPPLY (GRADUATED COMPRESSION STOCKINGS). Mild compression stockings feet to knees on during day off at night      magnesium hydroxide (MILK OF MAGNESIA) 400 MG/5ML suspension Take 1 Tablespoonful by mouth as needed for bowel management.      pramoxine (PRAMEGEL) 1 % GEL topical gel Apply to vaginal area topically every 12 hours as needed for buck area irritation      valACYclovir (VALTREX) 500 MG tablet Take 500 mg by mouth daily           Allergies   No Known Allergies  Data   Most Recent 3 CBC's:  Recent Labs   Lab Test 12/19/19  0412 12/18/19  0412 12/14/19  0520   WBC 9.2 11.2* 8.6   HGB 11.5* 11.5* 12.7   MCV 91 92 96    231 111*      Most Recent 3 BMP's:  Recent Labs   Lab Test 12/19/19  1240 12/19/19  0412 12/18/19  0412 12/17/19  0420   NA  --  144 142 142   POTASSIUM 3.4* 3.3* 3.8 4.2   CHLORIDE  --  110* 114* 106   CO2  --  25 20* 24   BUN  --  9 14 16   CR  --  0.90 0.87 1.20   ANIONGAP  --  9 8 12   VIRGIE  --  8.1* 7.8* 8.9   GLC  --  85 103 128*     Most Recent 2 LFT's:  Recent Labs   Lab Test 05/13/15  1138   ALKPHOS 74   BILITOTAL 0.6     Most Recent INR's and Anticoagulation Dosing History:  Anticoagulation Dose History     There is no flowsheet data to display.        Most Recent 3 Troponin's:No lab results found.  Most Recent Cholesterol Panel:  Recent Labs   Lab Test 05/13/15  1138   LDL 75   HDL 47   TRIG 207*     Most Recent 6 Bacteria Isolates From Any Culture (See EPIC Reports for Culture Details):  Recent Labs   Lab Test 12/17/19  0329   CULT >100,000 colonies/mL  Escherichia coli  *     Most Recent TSH, T4 and A1c Labs:  Recent Labs   Lab Test  04/06/16  1739   T4 0.69     Results for orders placed or performed during the hospital encounter of 12/11/19   XR Mandible G/E 4 Views    Narrative    PROCEDURE: XR MANDIBLE G/E 4 VW 12/11/2019 11:05 AM    HISTORY: r/o dislocation, lock jaw, inability to close the mouth for  the past several weeks    COMPARISONS: None.    TECHNIQUE: Mandible 4 views    FINDINGS: No acute fracture. The temporomandibular joints may be  dislocated anteriorly, although images are slightly limited by  imposition of multiple bony structures and the patient's osteopenia.  There are degenerative changes in the partially visualized cervical  spine.       Impression    IMPRESSION: No acute fracture. Question anterior dislocation of the  mandible with respect to the temporomandibular joints.    JAVED PIKE MD

## 2019-12-19 NOTE — PLAN OF CARE
Pt disorientated x 4, arouses to voice or gentle shaking, afebrile, B/P elevated 153/79, HR kristina 52-54. Pt speech is garbled incomprehensible sounds. Lung sounds diminished throughout, frequent congested cough,shallow breathing noted, O2 95% on RA. Bowel sounds hypoactive. Aspiration precautions in place,pt up in recliner for breakfast, attempted to feed, pt unable to swallow, MD Lick aware. Coccyx pink, blanchable and intact, new Allevyn dressing placed, will continue to monitor. Virginia Rubio RN on 12/19/2019 at 10:18 AM

## 2019-12-20 NOTE — PROGRESS NOTES
:    Received report from Jennifer Fisher,  that [facility] declined patient as they do not feel they can meet her needs at this time. Called patient's son, Sd and provided update. [facility] Anticipated discharge today. Sd is hesitant about this plan. Discussed other options such as SNF's outside of [place]. Sd does not feel that would be a good idea. Discussed shelter options. It seems patient does not have a payor source for an shelter at this time. Discussed patient getting on Elderly Waiver, which could take up to 60 days after [facility] [place]. Provided Sd with number for [facility]. Sd reports that he and his brother in [place] plan to come to [place] on Dec 27. Sd inquired if patient could stay in hospital until that date. Informed him that patient will likely need to discharge when medically ready. Sd requests conference call with this writer, MD, his brother, and himself at 0900.  to discuss this with MD.     [facility] on 12/20/2019 at 8:19 AM    Addendum:    [facility]. Inquired if they would be able to accept patient back today, if that is what family decides. Requested call back.     [facility] on 12/20/2019 at 8:36 AM    Addendum:    [facility].     MD also called Stewart[facility] and discussed [facility] plan of care. Stewart also reported to MD that they are happy to accept patient back.     This writer and MD called patient's son, Sd. Sd was not able to get a hold of his brother in [place] to be a part of call. MD discussed with Sd that patient is medically ready to discharge today. Sd acknowledges that he does not have many other options for placement, as [facility] declined patient, he does not want patient to go to a SNF outside of [place], and patient is not on Elderly Waiver to have a payor source for an retirement. Considering all of this, Sd would like patient to return to [facility]. MD discussed hospice as an option for patient. Sd declined hospice at this time. Sd reports that he will be visiting patient later next week and will reconsider hospice at that time. Sd is very appreciative of all the care patient has received at [facility] and thanks everyone involved in his mom's care while hospitalized.     [facility] on 12/20/2019 at 9:23 AM     Addendum:    Called Jennifer at [facility] and provided update that patient is ready to discharge back to their facility today. [facility] to transport patient at 1045. Called patient's son, Sd and provided him with this update. RN notified of discharge plans.     No further needs at this time.     [facility] on 12/20/2019 at 9:38 AM    Addendum:    Received call from Jennifer at . Per Jennifer, their Chief  requests that MD complete POLST prior to patient returning. MD completed POLST. Provided it to Charge RN to be scanned into patient's chart and sent to  at discharge.     [facility] on 12/20/2019 at 9:56 AM

## 2019-12-20 NOTE — PLAN OF CARE
"Patient resting in bed. Will arouse to voice or gentle shaking, otherwise very lethargic and sleeping through most of shift this far. Very obtunded, garbled speech, and not following commands. Patient would drink small amount of nectar thicken fluids. Continue aspiration precautions. Lung sounds diminished to the bases. Productive and congested cough. Heart rate irregular and bradycardic. Refusing SCD pumps. Check, change and reposition every 2 hours. Saline locked. Scattered brusies. Allevyn dressing clean, dry and intact to buttocks.     Vitals stable, BP (!) 142/75 (BP Location: Right arm)   Pulse 53   Temp 98.2  F (36.8  C) (Tympanic)   Resp 18   Ht 1.575 m (5' 2\")   Wt 61.8 kg (136 lb 3.9 oz)   LMP  (LMP Unknown)   SpO2 95%   BMI 24.92 kg/m      Iron Valenzuela RN 12/20/19 12:01 AM      "

## 2019-12-20 NOTE — PROGRESS NOTES
Patient has been discharged at this time via w/c status with transport aid. Patient discharging to . Personal belongings sent with. Buck cath remains in place. Discharge paperwork sent with. All questions answered. Nurse to nurse to Karla @  given. All questions answered. Helene White RN on 12/20/2019 at 10:54 AM

## 2019-12-20 NOTE — PROGRESS NOTES
"Continues to be sleeping in bed. Opens eyes with repositions. Check, change and repo every 2 hours. Skin intact. Allevyn dressing to bottom continues to be intact. Lung sounds clear. Heart rate bradycardic and irregular. SCD in place.  Assessment unchanged from earlier.     Vitals stable, /62   Pulse 53   Temp 96.3  F (35.7  C) (Tympanic)   Resp 18   Ht 1.575 m (5' 2\")   Wt 61.8 kg (136 lb 3.9 oz)   LMP  (LMP Unknown)   SpO2 93%   BMI 24.92 kg/m      Iron Valenzuela RN 12/20/19 2:26 AM        "

## 2019-12-20 NOTE — PROGRESS NOTES
SAFETY CHECKLIST  ID Bands and Risk clasps correct and in place (DNR, Fall risk, Allergy, Latex, Limb):  Yes  All Lines Reconciled and labeled correctly: Yes  Whiteboard updated:Yes  Environmental interventions (bed/chair alarm on, call light, side rails, restraints, sitter....): Yes    Iron Valenzuela RN 12/19/19 7:03 PM

## 2019-12-20 NOTE — PROGRESS NOTES
Potassium returned at 3.1 this morning. Will begin replacement protocols.     Iron Valenzuela RN 12/20/19 5:51 AM

## 2019-12-20 NOTE — PHARMACY - DISCHARGE MEDICATION RECONCILIATION
Pharmacy:  Discharge Counseling and Medication Reconciliation    Tika Mcduffieflynn  [id number] [facility] [address]  [phone] (home)   86 year old female  PCP: No Ref-Primary, Physician    Allergies: Patient has no known allergies.    Discharge Counseling:    Pharmacist met with patient (and/or family) today to review the medication portion of the After Visit Summary (with an emphasis on NEW medications) and to address patient's questions/concerns.    Summary of Education: patient to be discharged [facility], no education necessary.    Materials Provided:  MedCounselor sheets printed from Clinical Pharmacology on: NA    Discharge Medication Reconciliation:    It has been determined that the patient has an adequate supply of medications available or which can be obtained from the patient's preferred pharmacy.    Thank you for the consult.    Urmila Hernadez RPH........December 20, 2019 9:27 AM

## 2019-12-20 NOTE — PROGRESS NOTES
SAFETY CHECKLIST  ID Bands and Risk clasps correct and in place (DNR, Fall risk, Allergy, Latex, Limb):  Yes  All Lines Reconciled and labeled correctly: Yes  Whiteboard updated:Yes  Environmental interventions (bed/chair alarm on, call light, side rails, restraints, sitter....): Yes    Helene White RN on 12/20/2019 at 7:01 AM

## 2019-12-23 NOTE — TELEPHONE ENCOUNTER
"Transitional Care Management Phone Call    Summary of hospitalization:  [facility] discharge summary reviewed    DISCHARGE DIAGNOSIS: hypernatremia    DATE OF DISCHARGE: 12-20-19    Diagnostic Tests/Treatments reviewed.  Follow up needed: none    Post Discharge Medication Reconciliation: discharge medications reconciled and changed, per note/orders (see AVS).  Medications were correct with exception of compression stockings which were discontinued \"a long time ago\" per nurseWanda. Reports patient has had no problems with edema for a long time.     [facility]    Problems taking medications regularly:  Is on dysphagia diet Level 1, per nurse she knows of no concerns with medications specifically. Although an episode of emesis was noted on 12-21-19.     Problems adhering to non-medication therapy:  None    Other Healthcare Providers Involved in Patient s Care:         Skilled nursing facility, [facility]     Update since discharge: stable. NurseWanda, states that patient was doing well this morning, ate 100% of her breakfast and 360cc of fluids. She is taking her protein drinks fine. Did have an emesis on the 21st.  Patient did finish 3 day course of Bactrim. Confirmed that nurse had instructions to attempt pulling the Buck cath with a void trial X 2 today. No other concerns at this time.    Plan of care communicated with other healthcare provider    Just a friendly reminder that you appointment is   Next 5 appointments (look out 90 days)    Dec 26, 2019  2:15 PM CST  Office Visit with Enrico KING MD  [facility] ([facility]) [address]   [place] [id number] [phone]      .  We encourage you to keep this appointment.    Please remember to bring all of your pills in their bottles (including any vitamins or over the counter pills) with you to your appointment.   The patient indicates " understanding of these issues and agrees with the plan of care.   Yes, plans to follow plan of care and keep the scheduled appointment.    Was the patient contacted within the 2 business days or other approved timeframe?  Yes    Was the Medication reconciliation and management done since the patient was discharged? Yes    Marlys Patel RN  12/23/2019 10:22 AM

## 2019-12-26 NOTE — PROGRESS NOTES
SUBJECTIVE:                                                      Patient presents for Hospital Followup Visit:    Hospital:  [facility]      Date of Admission: 12/11/19  Date of Discharge: 12/20/19  Transitional Care Management Phone Call: 12/23/19  Reason(s) for Admission: Hypernatremic dehydration. Jaw disclocation            Problems taking medications regularly:  None       Medication changes since discharge: None       Problems adhering to non-medication therapy:  None    Summary of hospitalization:  [facility] discharge summary reviewed    Patient had a dislocated jaw and developed significant hypernatremic dehydration and acute renal failure.  Her jaw was able to be reduced in the emergency room.  She was admitted to the hospital because of markedly reduced oral intake resulting in the aforementioned medical problems.  She has moderate to severe Alzheimer's disease.  She was taking a dysphagia diet without difficulty and having good urine output and her mental status was back to baseline.  The hospitalists have a conference with her sons to discuss further cares and while she we wish to remain DNR/DNI they wanted to continue with treating easily reversible conditions and consider rehospitalization if necessary.  They were going to have a discussion with the family members to discuss possible hospice cares and/or comfort care if she continued to show evidence of ongoing decline due to progressive dementia.    Her hypernatremia was corrected and she maintained her labs after stopping IV fluids several days prior to discharge.  She had a Buck catheter placed for urine output monitoring on admit and then it was removed and she failed a voiding trial so the Buck was replaced and she subsequently developed a catheter associated UTI for which she was treated with IV ceftriaxone.  She was transitioned to Bactrim once the culture results were known and her catheter was removed with ability  to void after discharge from the hospital.      Diagnostic Tests/Treatments reviewed.  Follow up needed: none  Other Healthcare Providers Involved in Patient s Care:         None  Update since discharge: improved.  No notes sent with the patient and the staff who accompanies her does not typically work on her unit so really cannot provide any additional history.  They did not send her vitals but did send her medication lists which are reconciled.    ROS:  Unable to to dementia    OBJECTIVE:                                                      Alert and cooperative with exam and smiles.  She has lid ectropion that is noted left greater than right.  She does have tears present and mucous membranes are moist.  Jaw is mobile and nontender.  Neck is supple without significant adenopathy.  She sounds congested but lungs are clear and she has transmitted upper airway sounds that clear with cough.  Cardiac RRR without murmur.  She has no pedal edema.  Buck has been removed.  Abdomen is soft and nontender.    Results for orders placed or performed in visit on 12/26/19   Basic metabolic panel     Status: Abnormal   Result Value Ref Range    Sodium 133 (L) 134 - 144 mmol/L    Potassium 3.5 3.5 - 5.1 mmol/L    Chloride 98 98 - 107 mmol/L    Carbon Dioxide 27 21 - 31 mmol/L    Anion Gap 8 3 - 14 mmol/L    Glucose 126 (H) 70 - 105 mg/dL    Urea Nitrogen 12 7 - 25 mg/dL    Creatinine 1.01 0.60 - 1.20 mg/dL    GFR Estimate 52 (L) >60 mL/min/[1.73_m2]    GFR Estimate If Black 63 >60 mL/min/[1.73_m2]    Calcium 8.2 (L) 8.6 - 10.3 mg/dL        ASSESSMENT/PLAN:                                                    Diagnoses and all orders for this visit:  TMJ (dislocation of temporomandibular joint), sequela  -     Basic metabolic panel; Future  Late onset Alzheimer's disease with behavioral disturbance (H)  -     Basic metabolic panel; Future  Hypernatremia  -     Basic metabolic panel; Future  Acute renal failure, unspecified acute  renal failure type, resolved  -     Basic metabolic panel; Future      Post Discharge Medication Reconciliation: discharge medications reconciled, continue medications without change.  Issues to address: She seems to be doing reasonably well.  Family needs to decide regarding comfort cares versus more aggressive care.  She seems to be stable.  Plan of care communicated with patient and caregiver.    Metabolic panel is pending.    Medications reviewed and renewed. Plan of care reviewed and renewed. Continue current medications and plan of care.       Type of Medical Decision Making Face-to-Face Visit within 7 Days Face-to-Face Visit within 14 days   Moderate Complexity [id number]   High Complexity [id number]     Enrico Corrigan MD

## 2019-12-31 NOTE — PROGRESS NOTES
[facility]  Acute Care Visit    Patient Name: Tika Au   : 1933  MRN: [id number]    Place of Service: [facility]  DOS: 2019    CC: herpes rash    HPI:  Tika Au is a 86 year old female with PMH of multiple chronic medical concerns, who is seen today regarding nursing staff has noticed a rash developing that is resembling shingles. Pt had been on valtrex 500 mg daily for a few years and was recently stopped during hospitalization. She is now having a recurrent outbreak of shingles on her buttocks. Pt does not seem to be having any pain with this as she is smiling and laughing at times. Rash is being covered with a dressing.       Multidisciplinary notes, laboratory values, medications, vital signs, weight and orders arereviewed from nursing home records. I have reviewed the patient s medical history and updated the computerized patient record.     PMH:  Past Medical History:   Diagnosis Date     Alzheimer's disease (H)     No Comments Provided     Hyperlipidemia     No Comments Provided     Hypothyroidism     No Comments Provided       Medications:  Current Outpatient Medications   Medication Sig Dispense Refill     bisacodyl (DULCOLAX) 10 MG suppository Place 1 suppository (10 mg) rectally every 72 hours as needed for constipation       Dentifrices (BIOTENE DRY MOUTH) GEL One application after meals and at bedtime for dry mouth       levothyroxine (SYNTHROID/LEVOTHROID) 50 MCG tablet Take 1 tablet (50 mcg) by mouth daily       magnesium hydroxide (MILK OF MAGNESIA) 400 MG/5ML suspension Take 5 mLs by mouth daily as needed for constipation for bowel management.       Nutritional Supplement LIQD 1 Can by Oral or Feeding Tube route 3 times daily       pramoxine (PRAMEGEL) 1 % GEL topical gel Apply topically 2 times daily as needed for itching Apply to vaginal area topically every 12 hours as needed for buck area irritation       Medication reconciliation complete  between Epic record and NH MAR.     Allergies:  No Known Allergies    Review of Systems:  Limited ROS due to dementia. See HPI.       Vital Signs:  Temp 99.0   Pulse 77   Respirations 18   /68   Oxygen Sats 97%   Weight 136.6#    Physical Exam:     Pleasant and alert without distress, smiles and laughs at times,  moderate to severe dementia, mumbling speech   Evidence of 2 areas of reddened pimple like areas on left buttocks and 1 on right buttocks. Also noted a line of reddened pimple like rash noted on right hip area.         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 Will restart valtrex 1000 mg BID x 7 days, then decrease to 500 mg daily ongoing. Nursing staff to call family and update them.         A total of 15 minutes was spent with this patient, of which more than 50% was spent in counseling and/or coordination of care.     GREY Orona, CNP ....................  12/31/2019   5:46 PM

## 2020-01-01 ENCOUNTER — MEDICAL CORRESPONDENCE (OUTPATIENT)
Dept: HEALTH INFORMATION MANAGEMENT | Facility: OTHER | Age: 85
End: 2020-01-01

## 2020-01-01 ENCOUNTER — NURSING HOME VISIT (OUTPATIENT)
Dept: GERIATRICS | Facility: OTHER | Age: 85
End: 2020-01-01
Attending: NURSE PRACTITIONER
Payer: MEDICARE

## 2020-01-01 ENCOUNTER — OFFICE VISIT (OUTPATIENT)
Dept: FAMILY MEDICINE | Facility: OTHER | Age: 85
End: 2020-01-01
Attending: FAMILY MEDICINE
Payer: MEDICARE

## 2020-01-01 VITALS — WEIGHT: 124.4 LBS | BODY MASS INDEX: 22.75 KG/M2 | HEART RATE: 68 BPM | RESPIRATION RATE: 28 BRPM | TEMPERATURE: 97.5 F

## 2020-01-01 DIAGNOSIS — G30.1 LATE ONSET ALZHEIMER'S DISEASE WITH BEHAVIORAL DISTURBANCE (H): Primary | ICD-10-CM

## 2020-01-01 DIAGNOSIS — R62.7 FAILURE TO THRIVE IN ADULT: Primary | ICD-10-CM

## 2020-01-01 DIAGNOSIS — Z78.9 NURSING HOME RESIDENT: ICD-10-CM

## 2020-01-01 DIAGNOSIS — G30.1 LATE ONSET ALZHEIMER'S DISEASE WITHOUT BEHAVIORAL DISTURBANCE (H): ICD-10-CM

## 2020-01-01 DIAGNOSIS — F02.818 LATE ONSET ALZHEIMER'S DISEASE WITH BEHAVIORAL DISTURBANCE (H): Primary | ICD-10-CM

## 2020-01-01 DIAGNOSIS — F02.80 LATE ONSET ALZHEIMER'S DISEASE WITHOUT BEHAVIORAL DISTURBANCE (H): ICD-10-CM

## 2020-01-01 DIAGNOSIS — K21.9 GASTROESOPHAGEAL REFLUX DISEASE WITHOUT ESOPHAGITIS: ICD-10-CM

## 2020-01-01 DIAGNOSIS — F45.8 BRUXISM (TEETH GRINDING): ICD-10-CM

## 2020-01-01 DIAGNOSIS — F45.8 BRUXISM (TEETH GRINDING): Primary | ICD-10-CM

## 2020-01-01 PROCEDURE — 99310 SBSQ NF CARE HIGH MDM 45: CPT | Performed by: NURSE PRACTITIONER

## 2020-01-01 PROCEDURE — 99214 OFFICE O/P EST MOD 30 MIN: CPT | Performed by: FAMILY MEDICINE

## 2020-01-01 PROCEDURE — G0463 HOSPITAL OUTPT CLINIC VISIT: HCPCS

## 2020-01-01 RX ORDER — BACLOFEN 10 MG/1
5 TABLET ORAL AT BEDTIME
Qty: 120 TABLET | COMMUNITY
Start: 2020-01-01

## 2020-01-21 PROBLEM — F45.8 BRUXISM (TEETH GRINDING): Status: ACTIVE | Noted: 2020-01-01

## 2020-01-21 NOTE — PROGRESS NOTES
[facility]  Acute Care Visit    Patient Name: Tika Au   : 1933  MRN: [id number]    Place of Service: [facility]  DOS: 2020    CC: teeth grinding, gerd    HPI:  Tika Au is a 86 year old female with PMH of multiple chronic medical concerns, who is seen today regarding pt has hx of lock jaw/jaw dislocation and nursing staff noticed that pt is now grinding her teeth when she sleeps.  She does not appear in pain, or anxious or fearful. She is always happy and smiley. She has had acid reflux symptoms in the past and was successfully treated with omeprazole but this was stopped due to risks/benefits of long term use. Acid reflux symptoms have returned and it may be connected with bruxism.   Pt is comfortable. Nursing staff continues to apply heat to jaw for comfort.     Multidisciplinary notes, laboratory values, medications, vital signs, weight and orders arereviewed from nursing home records. I have reviewed the patient s medical history and updated the computerized patient record.     PMH:  Past Medical History:   Diagnosis Date     Alzheimer's disease (H)     No Comments Provided     Hyperlipidemia     No Comments Provided     Hypothyroidism     No Comments Provided       Medications:  Current Outpatient Medications   Medication Sig Dispense Refill     bisacodyl (DULCOLAX) 10 MG suppository Place 1 suppository (10 mg) rectally every 72 hours as needed for constipation       Dentifrices (BIOTENE DRY MOUTH) GEL One application after meals and at bedtime for dry mouth       levothyroxine (SYNTHROID/LEVOTHROID) 50 MCG tablet Take 1 tablet (50 mcg) by mouth daily       magnesium hydroxide (MILK OF MAGNESIA) 400 MG/5ML suspension Take 5 mLs by mouth daily as needed for constipation for bowel management.       Nutritional Supplement LIQD 1 Can by Oral or Feeding Tube route 3 times daily       pramoxine (PRAMEGEL) 1 % GEL topical gel Apply topically 2 times daily as needed  for itching Apply to vaginal area topically every 12 hours as needed for buck area irritation       valACYclovir (VALTREX) 500 MG tablet Take 1 tablet (500 mg) by mouth daily       Medication reconciliation complete between Epic record and NH MAR.     Allergies:  No Known Allergies    Review of Systems:  Limited ROS due to dementia. See HPI.       Vital Signs:  Temp 98.2   Pulse 82   Respirations 19   /78   Oxygen Sats 96%   Weight 132.6#    Physical Exam:     Pleasant and alert without distress. Pt smiles, nonsensical talk or speech that is gibberish.    Skin color pink. Mucous membranes moist.  Jaw muscles relaxed, no tension noted but off and on she would start grinding her teeth back and forth, an audible grinding sound.   Unable to examine teeth as she would clamp down mouth when attempted to assess.  Neck supple and without adenopathy   Lungs clear   Cardiovascular regular  Able to move upper and lower extremities         Assessment/Plan:  (F45.8) Bruxism (teeth grinding)  (primary encounter diagnosis)  Comment: acute--concern for re-occurence of jaw dislocation  Plan: referral to dentist asap, apply heat TID 20 min, schedule baclofen 5 mg at bedtime and 5 mg BID PRN during day if noticeably grinding teeth.     (K21.9) Gastroesophageal reflux disease without esophagitis  Comment: recurrent, small vomit after meals mostly in evenings  Plan: It has been linked that gerd can increase occurrence of bruxism. Will treat with omeprazole 20 mg po daily.     Pt does not seem anxious or in pain or fearful that would be causing any tension or reason to grind teeth for tense jaw muscles. Teeth grinding seems to occur when she is sleeping. It will be difficult to prevent her from doing this but would appreciate dental recommendations if teeth abnormalities a cause of this.     Talked with son Sd mobley: these recommendations and he is in agreement.     A total of 42 minutes was spent with this patient, of which more  than 50% was spent in counseling and/or coordination of care.     GREY Orona, CNP ....................  1/21/2020   8:49 AM

## 2020-01-30 NOTE — PROGRESS NOTES
[facility]  Acute Care Visit    Patient Name: Tika Au   : 1933  MRN: [id number]    Place of Service: [facility]  DOS: 2020    CC: follow up bruxism, family asking if antianxiety medication would be helpful    HPI:  Tika Au is a 86 year old female with PMH of multiple chronic medical concerns, who is seen today regarding family asking if anti-anxiety medication would be helpful to reduce bruxism.   Pt continues to grind teeth at various times of the day but it does not seem to correlate with any sign of tension or stress. Pt never appears anxious or nervous. She is often smiling, clapping her hands or just appears relaxed.   Dr. Carmen lyons dentist saw pt in office and today at nursing facility and recommended massages around TMJ point and temporal area. It was reported to me that it would not be possible to make a mouth guard due to her inability to follow commands and also would likely bite if put fingers in her mouth. Bahman, son, is requesting Dr. Morales call him to discuss treatment plan. This message was relayed to Helena, nurse manager to let him know to call Bahman.   Vitals stable.   Pt has been pocketing food more and spitting food out so speech therapy consult ordered.   Weights have been declining slowly again with most recent weight pt had lost 5# over past month.         Multidisciplinary notes, laboratory values, medications, vital signs, weight and orders arereviewed from nursing home records. I have reviewed the patient s medical history and updated the computerized patient record.     PMH:  Past Medical History:   Diagnosis Date     Alzheimer's disease (H)     No Comments Provided     Hyperlipidemia     No Comments Provided     Hypothyroidism     No Comments Provided       Medications:  Current Outpatient Medications   Medication Sig Dispense Refill     baclofen (LIORESAL) 10 MG tablet Take 0.5 tablets (5 mg) by mouth At Bedtime And 5 mg BID  PRN teeth grinding 120 tablet      bisacodyl (DULCOLAX) 10 MG suppository Place 1 suppository (10 mg) rectally every 72 hours as needed for constipation       Dentifrices (BIOTENE DRY MOUTH) GEL One application after meals and at bedtime for dry mouth       levothyroxine (SYNTHROID/LEVOTHROID) 50 MCG tablet Take 1 tablet (50 mcg) by mouth daily       magnesium hydroxide (MILK OF MAGNESIA) 400 MG/5ML suspension Take 5 mLs by mouth daily as needed for constipation for bowel management.       Nutritional Supplement LIQD 1 Can by Oral or Feeding Tube route 3 times daily       omeprazole (PRILOSEC) 20 MG DR capsule Take 1 capsule (20 mg) by mouth daily       pramoxine (PRAMEGEL) 1 % GEL topical gel Apply topically 2 times daily as needed for itching Apply to vaginal area topically every 12 hours as needed for buck area irritation       valACYclovir (VALTREX) 500 MG tablet Take 1 tablet (500 mg) by mouth daily       Medication reconciliation complete between [facility] record and NH MAR.     Allergies:  No Known Allergies    Review of Systems:  Limited ROS due to dementia. See HPI.       Vital Signs:  Temp 97.2   Pulse 57   Respirations 18   /59   Oxygen Sats 96%   Weight 128#    Physical Exam:     Pleasant and alert without distress.  Smiling when I come close to talk with her. She has a warm cloth by her left cheek and I move it closer to her cheek and she leans into it and smiles and snuggles into warm cloth.   Sclera nonicteric, conjunctiva non-inflamed.  Skin color pink.   Neck supple and without adenopathy   Lungs clear   Cardiovascular regular      New Labs/Diagnostics:  Last Comprehensive Metabolic Panel:  Sodium   Date Value Ref Range Status   12/26/2019 133 (L) 134 - 144 mmol/L Final     Potassium   Date Value Ref Range Status   12/26/2019 3.5 3.5 - 5.1 mmol/L Final     Chloride   Date Value Ref Range Status   12/26/2019 98 98 - 107 mmol/L Final     Carbon Dioxide   Date Value Ref Range Status   12/26/2019 27 21  - 31 mmol/L Final     Anion Gap   Date Value Ref Range Status   12/26/2019 8 3 - 14 mmol/L Final     Glucose   Date Value Ref Range Status   12/26/2019 126 (H) 70 - 105 mg/dL Final     Urea Nitrogen   Date Value Ref Range Status   12/26/2019 12 7 - 25 mg/dL Final     Creatinine   Date Value Ref Range Status   12/26/2019 1.01 0.60 - 1.20 mg/dL Final     GFR Estimate   Date Value Ref Range Status   12/26/2019 52 (L) >60 mL/min/[1.73_m2] Final     Calcium   Date Value Ref Range Status   12/26/2019 8.2 (L) 8.6 - 10.3 mg/dL Final     Stable    Assessment/Plan:  (G30.1,  F02.81) Late onset Alzheimer's disease with behavioral disturbance (H)  (primary encounter diagnosis)  Comment: progressive  Plan: 25 min talk with Sd re: her continue decline, bruxism, now pocketing her food, slow decline in weight. She is showing a decline in her health likely a progression of her dementia. Discussed goals of care for her and if lock jaw were to happen again and initially Sd said he would want her sent to ED to have it put back into place but also need to consider what is best for patient's comfort and this may put her in more distress with transportation, risks/benefits of anesthesia, unfamiliar surroundings  possible to cause increase confusion, pain, etc and that it may just be her dementia progressing. Risks and benefits considered. No plan was determined at this time. Comfort is focus.     (F45.8) Bruxism (teeth grinding)  Comment: intermittent  Plan: speech therapy consult for swallow eval  Cont baclofen at bedtime and prn  Start massages to TMJ area and temporal area daily and prn  Dr. Morales to call Sd and update him on plan  I do not recommend starting an anti-anxiety or anti-depressant as she is not showing any signs of distress/ tension/ stress/fear/anxiety.   Sd is in agreement to not start.     A total of 50 minutes was spent with this patient, of which more than 50% was spent in counseling and/or coordination of  care.     GREY Orona, CNP ....................  1/30/2020   5:37 PM

## 2020-02-06 NOTE — PROGRESS NOTES
Nursing Notes:   Abhinav Yudy KULKARNI., LPN  2/6/2020  9:27 AM  Signed  Patient presents to the clinic today for weight loss, and failure to thrive.  Med rec complete.  Yudy Connorspatricio BAUER.................. 2/6/2020 8:21 AM   SUBJECTIVE:  Tika Au  is a 86 year old female who was brought in from the nursing home because of weight loss. She was hospitalized in December with a dislocated jaw and severe hypernatremic dehydration and acute renal failure.  Her oral intake had dramatically decreased.  She was in the hospital for 9 days and improved slowly.  I saw her in follow-up a few days after she was discharged and she seemed to be doing well and her labs were normal.    She was seen on December 31 with recurrent herpes simplex on her buttocks after her Valtrex was stopped during her hospitalization.  She was on prophylaxis prior to that.  2 weeks later she was having some bruxism.  She seemed happy and did not seem to have any difficulty.  They put her on omeprazole because of the correlation with reflux and bruxism.  She was continuing to grind down a couple weeks later family was asking if an antianxiety medicine might help to reduce her bruxism.  She does not appear anxious to the staff or nervous and does appear relaxed.  She saw the dentist who recommended some TMJ and temporal massage.  The dentist did not feel that she would be a candidate for a mouthguard because of inability to comply.He also thought it would be difficult for staff to put it in and would be a danger of being bitten.  Speech therapy consult was ordered because of pocketing and spitting food.  A 5 pound weight loss over a month was noted.    Along with her med list and vitals sheet, a summary note was sent by the nurse manager Helena Navarrete RN.  The summary above is essentially reiterated in the letter.  Starting last week, she began pocketing and spitting her food and after she was seen by speech therapy her diet was switched to  mechanical soft puréed meats and nectar thick liquids which really has not helped.  She is not been eating drinking or taking her medications and she has become lethargic and pale.  Staff is been updating her son Sd daily in both the NP and the nurse manager have talked to him about hospice and mailed him information.  Her son wanted to have her come in for an appointment and evaluation with me so she was brought in today.    Past Medical, Family, and Social History reviewed and updated as noted below.   ROS is negative except as noted above       No Known Allergies,   Family History   Problem Relation Age of Onset     Other - See Comments Mother         GI Disease, at 58, ruptured her bowel     Other - See Comments Father          in his 40's of lead poisoning related to painting occupation     Other - See Comments Brother    ,   Current Outpatient Medications   Medication     baclofen (LIORESAL) 10 MG tablet     bisacodyl (DULCOLAX) 10 MG suppository     Dentifrices (BIOTENE DRY MOUTH) GEL     levothyroxine (SYNTHROID/LEVOTHROID) 50 MCG tablet     magnesium hydroxide (MILK OF MAGNESIA) 400 MG/5ML suspension     omeprazole (PRILOSEC) 20 MG DR capsule     pramoxine (PRAMEGEL) 1 % GEL topical gel     valACYclovir (VALTREX) 500 MG tablet     Nutritional Supplement LIQD     No current facility-administered medications for this visit.    ,   Past Medical History:   Diagnosis Date     Alzheimer's disease (H)     No Comments Provided     Hyperlipidemia     No Comments Provided     Hypothyroidism     No Comments Provided   ,   Patient Active Problem List    Diagnosis Date Noted     Bruxism (teeth grinding) 2020     Priority: Medium     Hypernatremia 2019     Priority: Medium     ARF (acute renal failure) (H) 2019     Priority: Medium     Jaw dislocation 2019     Priority: Medium     Gastroesophageal reflux disease without esophagitis 2018     Priority: Medium     Nursing home  resident 09/19/2017     Priority: Medium     Comfort measures only status 04/16/2016     Priority: Medium     Weight loss 04/16/2016     Priority: Medium     Bilateral lower extremity edema 02/01/2016     Priority: Medium     Late onset Alzheimer's disease with behavioral disturbance (H) 02/01/2016     Priority: Medium     Recurrent herpes simplex 02/01/2016     Priority: Medium     Ankle edema 12/19/2015     Priority: Medium     Hyperlipidemia 05/13/2015     Priority: Medium     Hypertension 05/13/2015     Priority: Medium     Personal history of tobacco use, presenting hazards to health 02/17/2011     Priority: Medium     Alzheimer's disease (H) 10/26/2009     Priority: Medium     Overview:   moderate memory loss, mme 22. on 12/2005       Hypothyroidism 10/26/2009     Priority: Medium   ,   Past Surgical History:   Procedure Laterality Date     APPENDECTOMY OPEN      No Comments Provided     EXTRACAPSULAR CATARACT EXTRATION WITH INTRAOCULAR LENS IMPLANT      2010,bilateral     FRACTURE SURGERY      age 42,Rib resection     SIGMOIDOSCOPY FLEXIBLE      3/99    and   Social History     Tobacco Use     Smoking status: Former Smoker     Types: Cigarettes     Last attempt to quit: 10/4/2016     Years since quitting: 3.3     Smokeless tobacco: Never Used   Substance Use Topics     Alcohol use: No     Alcohol/week: 0.0 standard drinks     OBJECTIVE:  Pulse 68   Temp 97.5  F (36.4  C) (Tympanic)   Resp 28   Wt 56.4 kg (124 lb 6.4 oz)   LMP  (LMP Unknown)   Breastfeeding No   BMI 22.75 kg/m     EXAM:  She is quiet and has her eyes closed.  Mucous membranes appear dry.  She is really not communicative but does respond.  She is cooperative with exam.  She has no abdominal tenderness or pedal edema.  Lungs are clear, no rales rhonchi or wheezes are heard.  Cardiac RRR without any change in her murmur.  Weight on January 3 was 136 pounds and on February 3 was 124.4 pounds.  ASSESSMENT/Plan :    Tika was seen today for  weight loss.    Diagnoses and all orders for this visit:    Failure to thrive in adult    Nursing home resident    Late onset Alzheimer's disease without behavioral disturbance (H)    Bruxism (teeth grinding)      Spoke with the patient's son Roxana.([phone]) who lives in a [place] of [facility].  Discussed the fact that she is probably nearing the end of her life and that often patients with advanced dementia just stop eating.  She seems comfortable and in no distress.  We discussed proceeding with hospice and comfort cares after lengthy discussion and he seems to be comfortable with that.  He has a brother who lives at [facility] who has a brain injury and he wonders if perhaps having him come to visit might be something that would perk Tika up a bit.  This certainly would not be a bad idea, but I am not optimistic that it will change much of anything.  Discussed the fact that if she really does not take much in for nutrition that over the course of the next week she will continue to fail.  No life-sustaining measures with feeding tubes etc. are desired nor do I feel they are appropriate in this situation.  Support encouragement offered regarding the situation with his mom given to Sd.  Last for hospice consultation and comfort cares.    A total of 25 minutes was spent with the patient, and on the phone with her son discussing her situation and the plan of care, greater than 50% of the time was spent in counseling/discussion of the aforementioned concerns.     Enrico Corrigan MD

## 2020-02-06 NOTE — NURSING NOTE
Patient presents to the clinic today for weight loss, and failure to thrive.  Med rec complete.  Yudy La LPN.................. 2/6/2020 8:21 AM

## 2020-02-10 ENCOUNTER — TELEPHONE (OUTPATIENT)
Dept: FAMILY MEDICINE | Facility: OTHER | Age: 85
End: 2020-02-10

## 2023-12-13 NOTE — NURSING NOTE
"Chief Complaint   Patient presents with     Hospital F/U       Initial /82   Pulse 58   Temp 98.9  F (37.2  C) (Temporal)   Resp 16   LMP  (LMP Unknown)   SpO2 92%   Breastfeeding No  Estimated body mass index is 24.68 kg/m  as calculated from the following:    Height as of 12/11/19: 1.575 m (5' 2\").    Weight as of 12/20/19: 61.2 kg (134 lb 14.7 oz).  Medication Reconciliation: complete    Lisa Garcia LPN  "
98.2

## (undated) RX ORDER — SODIUM CHLORIDE 9 MG/ML
INJECTION, SOLUTION INTRAVENOUS
Status: DISPENSED
Start: 2019-01-01

## (undated) RX ORDER — FENTANYL CITRATE 50 UG/ML
INJECTION, SOLUTION INTRAMUSCULAR; INTRAVENOUS
Status: DISPENSED
Start: 2019-01-01